# Patient Record
Sex: FEMALE | Race: OTHER | HISPANIC OR LATINO | Employment: PART TIME | URBAN - METROPOLITAN AREA
[De-identification: names, ages, dates, MRNs, and addresses within clinical notes are randomized per-mention and may not be internally consistent; named-entity substitution may affect disease eponyms.]

---

## 2020-07-10 ENCOUNTER — LAB VISIT (OUTPATIENT)
Dept: LAB | Facility: HOSPITAL | Age: 20
End: 2020-07-10
Attending: NURSE PRACTITIONER
Payer: COMMERCIAL

## 2020-07-10 ENCOUNTER — OFFICE VISIT (OUTPATIENT)
Dept: OBSTETRICS AND GYNECOLOGY | Facility: CLINIC | Age: 20
End: 2020-07-10
Payer: COMMERCIAL

## 2020-07-10 VITALS
DIASTOLIC BLOOD PRESSURE: 68 MMHG | WEIGHT: 154.56 LBS | HEIGHT: 61 IN | BODY MASS INDEX: 29.18 KG/M2 | SYSTOLIC BLOOD PRESSURE: 112 MMHG

## 2020-07-10 DIAGNOSIS — Z11.3 SCREEN FOR STD (SEXUALLY TRANSMITTED DISEASE): ICD-10-CM

## 2020-07-10 DIAGNOSIS — Z30.46 ENCOUNTER FOR SURVEILLANCE OF IMPLANTABLE SUBDERMAL CONTRACEPTIVE: ICD-10-CM

## 2020-07-10 DIAGNOSIS — N89.8 VAGINAL DISCHARGE: ICD-10-CM

## 2020-07-10 DIAGNOSIS — Z01.419 ROUTINE GYNECOLOGICAL EXAMINATION: Primary | ICD-10-CM

## 2020-07-10 PROCEDURE — 99999 PR PBB SHADOW E&M-NEW PATIENT-LVL II: ICD-10-PCS | Mod: PBBFAC,,, | Performed by: NURSE PRACTITIONER

## 2020-07-10 PROCEDURE — 86703 HIV-1/HIV-2 1 RESULT ANTBDY: CPT

## 2020-07-10 PROCEDURE — 3008F PR BODY MASS INDEX (BMI) DOCUMENTED: ICD-10-PCS | Mod: CPTII,S$GLB,, | Performed by: NURSE PRACTITIONER

## 2020-07-10 PROCEDURE — 99385 PREV VISIT NEW AGE 18-39: CPT | Mod: S$GLB,,, | Performed by: NURSE PRACTITIONER

## 2020-07-10 PROCEDURE — 99385 PR PREVENTIVE VISIT,NEW,18-39: ICD-10-PCS | Mod: S$GLB,,, | Performed by: NURSE PRACTITIONER

## 2020-07-10 PROCEDURE — 87491 CHLMYD TRACH DNA AMP PROBE: CPT

## 2020-07-10 PROCEDURE — 3008F BODY MASS INDEX DOCD: CPT | Mod: CPTII,S$GLB,, | Performed by: NURSE PRACTITIONER

## 2020-07-10 PROCEDURE — 36415 COLL VENOUS BLD VENIPUNCTURE: CPT

## 2020-07-10 PROCEDURE — 86592 SYPHILIS TEST NON-TREP QUAL: CPT

## 2020-07-10 PROCEDURE — 99999 PR PBB SHADOW E&M-NEW PATIENT-LVL II: CPT | Mod: PBBFAC,,, | Performed by: NURSE PRACTITIONER

## 2020-07-10 NOTE — PROGRESS NOTES
CC: Well woman exam    HPI  Janelle Clemente is a 19 y.o. female  presents for a well woman exam.  LMP: No LMP recorded (lmp unknown). Patient has had an implant..  No issues, problems, or complaints.    History reviewed. No pertinent past medical history.  History reviewed. No pertinent surgical history.  Social History     Socioeconomic History    Marital status: Single     Spouse name: Not on file    Number of children: Not on file    Years of education: Not on file    Highest education level: Not on file   Occupational History    Not on file   Social Needs    Financial resource strain: Not on file    Food insecurity     Worry: Not on file     Inability: Not on file    Transportation needs     Medical: Not on file     Non-medical: Not on file   Tobacco Use    Smoking status: Never Smoker    Smokeless tobacco: Never Used   Substance and Sexual Activity    Alcohol use: Never     Frequency: Never    Drug use: Never    Sexual activity: Yes     Partners: Male     Birth control/protection: Implant   Lifestyle    Physical activity     Days per week: Not on file     Minutes per session: Not on file    Stress: Not on file   Relationships    Social connections     Talks on phone: Not on file     Gets together: Not on file     Attends [employer] service: Not on file     Active member of club or organization: Not on file     Attends meetings of clubs or organizations: Not on file     Relationship status: Not on file   Other Topics Concern    Not on file   Social History Narrative    Not on file     Family History   Problem Relation Age of Onset    Breast cancer Neg Hx     Colon cancer Neg Hx     Ovarian cancer Neg Hx     Thrombosis Neg Hx      OB History        0    Para   0    Term   0       0    AB   0    Living   0       SAB   0    TAB   0    Ectopic   0    Multiple   0    Live Births   0               No current outpatient medications on file.    GYNECOLOGY HISTORY:  Happy  "with Nexplanon as method of birth control     DATA REVIEWED:  Last pap: n/a    /68   Ht 5' 1" (1.549 m)   Wt 70.1 kg (154 lb 8.7 oz)   LMP  (LMP Unknown)   BMI 29.20 kg/m²     Review of Systems   Constitutional: Negative.    HENT: Negative.    Eyes: Negative.    Respiratory: Negative.    Cardiovascular: Negative.    Gastrointestinal: Negative.    Endocrine: Negative.    Genitourinary: Negative.    Musculoskeletal: Negative.    Skin: Negative.    Allergic/Immunologic: Negative.    Neurological: Negative.    Hematological: Negative.    Psychiatric/Behavioral: Negative.        Physical Exam  Constitutional:       Appearance: Normal appearance.   HENT:      Head: Normocephalic.      Nose: Nose normal.      Mouth/Throat:      Mouth: Mucous membranes are moist.   Eyes:      Extraocular Movements: Extraocular movements intact.   Neck:      Musculoskeletal: Normal range of motion.   Pulmonary:      Effort: Pulmonary effort is normal.   Abdominal:      General: Abdomen is flat.      Palpations: Abdomen is soft.   Genitourinary:     General: Normal vulva.      Rectum: Normal.   Musculoskeletal: Normal range of motion.   Skin:     General: Skin is warm and dry.   Neurological:      Mental Status: She is alert and oriented to person, place, and time.   Psychiatric:         Mood and Affect: Mood normal.         Behavior: Behavior normal.         Thought Content: Thought content normal.         Judgment: Judgment normal.         Routine gynecological examination    Screen for STD (sexually transmitted disease)  -     C. trachomatis/N. gonorrhoeae by AMP DNA [facility]; Cervix  -     HIV 1/2 Ag/Ab (4th Gen); Future; Expected date: 07/10/2020  -     RPR; Future; Expected date: 07/10/2020    Vaginal discharge  -     POCT WET PREP    Encounter for surveillance of implantable subdermal contraceptive    Happy with Nexplanon as method of birth control   Left arm implant palpated and visualized     Patient was counseled today on " A.C.S. Pap guidelines (Q3) and recommendations for yearly pelvic exams, yearly mammograms starting age 40, and clinical breast exams; to see her PCP for other health maintenance.

## 2020-07-11 LAB — RPR SER QL: NORMAL

## 2020-07-13 LAB
C TRACH DNA SPEC QL NAA+PROBE: NOT DETECTED
HIV 1+2 AB+HIV1 P24 AG SERPL QL IA: NEGATIVE
N GONORRHOEA DNA SPEC QL NAA+PROBE: NOT DETECTED

## 2021-04-27 ENCOUNTER — OFFICE VISIT (OUTPATIENT)
Dept: PRIMARY CARE CLINIC | Facility: CLINIC | Age: 21
End: 2021-04-27
Payer: COMMERCIAL

## 2021-04-27 VITALS
HEART RATE: 75 BPM | BODY MASS INDEX: 29.53 KG/M2 | OXYGEN SATURATION: 99 % | DIASTOLIC BLOOD PRESSURE: 60 MMHG | SYSTOLIC BLOOD PRESSURE: 100 MMHG | TEMPERATURE: 98 F | WEIGHT: 156.31 LBS

## 2021-04-27 DIAGNOSIS — H92.02 LEFT EAR PAIN: ICD-10-CM

## 2021-04-27 DIAGNOSIS — R07.0 THROAT PAIN IN ADULT: ICD-10-CM

## 2021-04-27 DIAGNOSIS — F45.8 TEETH GRINDING: ICD-10-CM

## 2021-04-27 DIAGNOSIS — M54.30 SCIATIC LEG PAIN: Primary | ICD-10-CM

## 2021-04-27 LAB
CTP QC/QA: YES
S PYO RRNA THROAT QL PROBE: NEGATIVE

## 2021-04-27 PROCEDURE — 87880 STREP A ASSAY W/OPTIC: CPT | Mod: QW,S$GLB,, | Performed by: FAMILY MEDICINE

## 2021-04-27 PROCEDURE — 1125F PR PAIN SEVERITY QUANTIFIED, PAIN PRESENT: ICD-10-PCS | Mod: S$GLB,,, | Performed by: FAMILY MEDICINE

## 2021-04-27 PROCEDURE — 87880 POCT RAPID STREP A: ICD-10-PCS | Mod: QW,S$GLB,, | Performed by: FAMILY MEDICINE

## 2021-04-27 PROCEDURE — 3008F BODY MASS INDEX DOCD: CPT | Mod: CPTII,S$GLB,, | Performed by: FAMILY MEDICINE

## 2021-04-27 PROCEDURE — 3008F PR BODY MASS INDEX (BMI) DOCUMENTED: ICD-10-PCS | Mod: CPTII,S$GLB,, | Performed by: FAMILY MEDICINE

## 2021-04-27 PROCEDURE — 87081 CULTURE SCREEN ONLY: CPT | Performed by: FAMILY MEDICINE

## 2021-04-27 PROCEDURE — 99204 PR OFFICE/OUTPT VISIT, NEW, LEVL IV, 45-59 MIN: ICD-10-PCS | Mod: 25,S$GLB,, | Performed by: FAMILY MEDICINE

## 2021-04-27 PROCEDURE — 99204 OFFICE O/P NEW MOD 45 MIN: CPT | Mod: 25,S$GLB,, | Performed by: FAMILY MEDICINE

## 2021-04-27 PROCEDURE — 99999 PR PBB SHADOW E&M-EST. PATIENT-LVL III: CPT | Mod: PBBFAC,,, | Performed by: FAMILY MEDICINE

## 2021-04-27 PROCEDURE — 1125F AMNT PAIN NOTED PAIN PRSNT: CPT | Mod: S$GLB,,, | Performed by: FAMILY MEDICINE

## 2021-04-27 PROCEDURE — 99999 PR PBB SHADOW E&M-EST. PATIENT-LVL III: ICD-10-PCS | Mod: PBBFAC,,, | Performed by: FAMILY MEDICINE

## 2021-04-27 RX ORDER — CYCLOBENZAPRINE HCL 5 MG
2.5-5 TABLET ORAL NIGHTLY PRN
Qty: 30 TABLET | Refills: 0 | Status: SHIPPED | OUTPATIENT
Start: 2021-04-27 | End: 2021-09-07

## 2021-04-27 RX ORDER — LORATADINE 10 MG/1
10 TABLET ORAL DAILY PRN
Qty: 30 TABLET | Refills: 0 | Status: SHIPPED | OUTPATIENT
Start: 2021-04-27 | End: 2021-09-07

## 2021-04-27 RX ORDER — FLUTICASONE PROPIONATE 50 MCG
2 SPRAY, SUSPENSION (ML) NASAL DAILY
Qty: 16 G | Refills: 1 | Status: SHIPPED | OUTPATIENT
Start: 2021-04-27 | End: 2021-09-07

## 2021-05-01 LAB — BACTERIA THROAT CULT: NORMAL

## 2021-09-07 ENCOUNTER — OFFICE VISIT (OUTPATIENT)
Dept: OBSTETRICS AND GYNECOLOGY | Facility: CLINIC | Age: 21
End: 2021-09-07
Payer: COMMERCIAL

## 2021-09-07 ENCOUNTER — LAB VISIT (OUTPATIENT)
Dept: LAB | Facility: HOSPITAL | Age: 21
End: 2021-09-07
Attending: NURSE PRACTITIONER
Payer: COMMERCIAL

## 2021-09-07 VITALS
SYSTOLIC BLOOD PRESSURE: 100 MMHG | WEIGHT: 130.94 LBS | BODY MASS INDEX: 24.72 KG/M2 | DIASTOLIC BLOOD PRESSURE: 62 MMHG | HEIGHT: 61 IN

## 2021-09-07 DIAGNOSIS — Z11.3 SCREENING FOR STDS (SEXUALLY TRANSMITTED DISEASES): ICD-10-CM

## 2021-09-07 DIAGNOSIS — Z30.09 OTHER GENERAL COUNSELING AND ADVICE FOR CONTRACEPTIVE MANAGEMENT: Primary | ICD-10-CM

## 2021-09-07 PROCEDURE — 99999 PR PBB SHADOW E&M-EST. PATIENT-LVL II: CPT | Mod: PBBFAC,,, | Performed by: NURSE PRACTITIONER

## 2021-09-07 PROCEDURE — 3078F DIAST BP <80 MM HG: CPT | Mod: CPTII,S$GLB,, | Performed by: NURSE PRACTITIONER

## 2021-09-07 PROCEDURE — 87591 N.GONORRHOEAE DNA AMP PROB: CPT | Performed by: NURSE PRACTITIONER

## 2021-09-07 PROCEDURE — 3008F BODY MASS INDEX DOCD: CPT | Mod: CPTII,S$GLB,, | Performed by: NURSE PRACTITIONER

## 2021-09-07 PROCEDURE — 36415 COLL VENOUS BLD VENIPUNCTURE: CPT | Performed by: NURSE PRACTITIONER

## 2021-09-07 PROCEDURE — 99213 OFFICE O/P EST LOW 20 MIN: CPT | Mod: S$GLB,,, | Performed by: NURSE PRACTITIONER

## 2021-09-07 PROCEDURE — 1159F PR MEDICATION LIST DOCUMENTED IN MEDICAL RECORD: ICD-10-PCS | Mod: CPTII,S$GLB,, | Performed by: NURSE PRACTITIONER

## 2021-09-07 PROCEDURE — 87389 HIV-1 AG W/HIV-1&-2 AB AG IA: CPT | Performed by: NURSE PRACTITIONER

## 2021-09-07 PROCEDURE — 99999 PR PBB SHADOW E&M-EST. PATIENT-LVL II: ICD-10-PCS | Mod: PBBFAC,,, | Performed by: NURSE PRACTITIONER

## 2021-09-07 PROCEDURE — 86592 SYPHILIS TEST NON-TREP QUAL: CPT | Performed by: NURSE PRACTITIONER

## 2021-09-07 PROCEDURE — 3078F PR MOST RECENT DIASTOLIC BLOOD PRESSURE < 80 MM HG: ICD-10-PCS | Mod: CPTII,S$GLB,, | Performed by: NURSE PRACTITIONER

## 2021-09-07 PROCEDURE — 87491 CHLMYD TRACH DNA AMP PROBE: CPT | Performed by: NURSE PRACTITIONER

## 2021-09-07 PROCEDURE — 3074F PR MOST RECENT SYSTOLIC BLOOD PRESSURE < 130 MM HG: ICD-10-PCS | Mod: CPTII,S$GLB,, | Performed by: NURSE PRACTITIONER

## 2021-09-07 PROCEDURE — 3074F SYST BP LT 130 MM HG: CPT | Mod: CPTII,S$GLB,, | Performed by: NURSE PRACTITIONER

## 2021-09-07 PROCEDURE — 1159F MED LIST DOCD IN RCRD: CPT | Mod: CPTII,S$GLB,, | Performed by: NURSE PRACTITIONER

## 2021-09-07 PROCEDURE — 99213 PR OFFICE/OUTPT VISIT, EST, LEVL III, 20-29 MIN: ICD-10-PCS | Mod: S$GLB,,, | Performed by: NURSE PRACTITIONER

## 2021-09-07 PROCEDURE — 3008F PR BODY MASS INDEX (BMI) DOCUMENTED: ICD-10-PCS | Mod: CPTII,S$GLB,, | Performed by: NURSE PRACTITIONER

## 2021-09-08 LAB
C TRACH DNA SPEC QL NAA+PROBE: NOT DETECTED
HIV 1+2 AB+HIV1 P24 AG SERPL QL IA: NEGATIVE
N GONORRHOEA DNA SPEC QL NAA+PROBE: NOT DETECTED
RPR SER QL: NORMAL

## 2021-10-21 ENCOUNTER — OFFICE VISIT (OUTPATIENT)
Dept: OBSTETRICS AND GYNECOLOGY | Facility: CLINIC | Age: 21
End: 2021-10-21
Payer: COMMERCIAL

## 2021-10-21 VITALS
WEIGHT: 136.88 LBS | BODY MASS INDEX: 25.84 KG/M2 | DIASTOLIC BLOOD PRESSURE: 72 MMHG | HEIGHT: 61 IN | SYSTOLIC BLOOD PRESSURE: 102 MMHG

## 2021-10-21 DIAGNOSIS — N89.8 VAGINAL ITCHING: Primary | ICD-10-CM

## 2021-10-21 PROCEDURE — 99213 PR OFFICE/OUTPT VISIT, EST, LEVL III, 20-29 MIN: ICD-10-PCS | Mod: S$GLB,,, | Performed by: NURSE PRACTITIONER

## 2021-10-21 PROCEDURE — 87481 CANDIDA DNA AMP PROBE: CPT | Mod: 59 | Performed by: NURSE PRACTITIONER

## 2021-10-21 PROCEDURE — 1159F PR MEDICATION LIST DOCUMENTED IN MEDICAL RECORD: ICD-10-PCS | Mod: CPTII,S$GLB,, | Performed by: NURSE PRACTITIONER

## 2021-10-21 PROCEDURE — 3078F DIAST BP <80 MM HG: CPT | Mod: CPTII,S$GLB,, | Performed by: NURSE PRACTITIONER

## 2021-10-21 PROCEDURE — 99999 PR PBB SHADOW E&M-EST. PATIENT-LVL III: CPT | Mod: PBBFAC,,, | Performed by: NURSE PRACTITIONER

## 2021-10-21 PROCEDURE — 3074F SYST BP LT 130 MM HG: CPT | Mod: CPTII,S$GLB,, | Performed by: NURSE PRACTITIONER

## 2021-10-21 PROCEDURE — 3008F BODY MASS INDEX DOCD: CPT | Mod: CPTII,S$GLB,, | Performed by: NURSE PRACTITIONER

## 2021-10-21 PROCEDURE — 3008F PR BODY MASS INDEX (BMI) DOCUMENTED: ICD-10-PCS | Mod: CPTII,S$GLB,, | Performed by: NURSE PRACTITIONER

## 2021-10-21 PROCEDURE — 1160F PR REVIEW ALL MEDS BY PRESCRIBER/CLIN PHARMACIST DOCUMENTED: ICD-10-PCS | Mod: CPTII,S$GLB,, | Performed by: NURSE PRACTITIONER

## 2021-10-21 PROCEDURE — 1160F RVW MEDS BY RX/DR IN RCRD: CPT | Mod: CPTII,S$GLB,, | Performed by: NURSE PRACTITIONER

## 2021-10-21 PROCEDURE — 99213 OFFICE O/P EST LOW 20 MIN: CPT | Mod: S$GLB,,, | Performed by: NURSE PRACTITIONER

## 2021-10-21 PROCEDURE — 1159F MED LIST DOCD IN RCRD: CPT | Mod: CPTII,S$GLB,, | Performed by: NURSE PRACTITIONER

## 2021-10-21 PROCEDURE — 3078F PR MOST RECENT DIASTOLIC BLOOD PRESSURE < 80 MM HG: ICD-10-PCS | Mod: CPTII,S$GLB,, | Performed by: NURSE PRACTITIONER

## 2021-10-21 PROCEDURE — 3074F PR MOST RECENT SYSTOLIC BLOOD PRESSURE < 130 MM HG: ICD-10-PCS | Mod: CPTII,S$GLB,, | Performed by: NURSE PRACTITIONER

## 2021-10-21 PROCEDURE — 99999 PR PBB SHADOW E&M-EST. PATIENT-LVL III: ICD-10-PCS | Mod: PBBFAC,,, | Performed by: NURSE PRACTITIONER

## 2021-10-21 RX ORDER — FLUCONAZOLE 150 MG/1
TABLET ORAL
Qty: 3 TABLET | Refills: 0 | Status: SHIPPED | OUTPATIENT
Start: 2021-10-21 | End: 2021-11-26

## 2021-10-25 LAB
BACTERIAL VAGINOSIS DNA: NEGATIVE
CANDIDA GLABRATA DNA: NEGATIVE
CANDIDA KRUSEI DNA: NEGATIVE
CANDIDA RRNA VAG QL PROBE: POSITIVE
T VAGINALIS RRNA GENITAL QL PROBE: NEGATIVE

## 2021-11-16 ENCOUNTER — OFFICE VISIT (OUTPATIENT)
Dept: OBSTETRICS AND GYNECOLOGY | Facility: CLINIC | Age: 21
End: 2021-11-16
Payer: COMMERCIAL

## 2021-11-16 VITALS
HEIGHT: 61 IN | DIASTOLIC BLOOD PRESSURE: 82 MMHG | SYSTOLIC BLOOD PRESSURE: 110 MMHG | BODY MASS INDEX: 25.64 KG/M2 | WEIGHT: 135.81 LBS

## 2021-11-16 DIAGNOSIS — N64.9 ABNORMAL NIPPLE: ICD-10-CM

## 2021-11-16 DIAGNOSIS — Z97.5 NEXPLANON IN PLACE: ICD-10-CM

## 2021-11-16 DIAGNOSIS — Z12.4 PAPANICOLAOU SMEAR FOR CERVICAL CANCER SCREENING: ICD-10-CM

## 2021-11-16 DIAGNOSIS — Z01.419 ROUTINE GYNECOLOGICAL EXAMINATION: Primary | ICD-10-CM

## 2021-11-16 PROCEDURE — 3008F BODY MASS INDEX DOCD: CPT | Mod: CPTII,S$GLB,, | Performed by: NURSE PRACTITIONER

## 2021-11-16 PROCEDURE — 3008F PR BODY MASS INDEX (BMI) DOCUMENTED: ICD-10-PCS | Mod: CPTII,S$GLB,, | Performed by: NURSE PRACTITIONER

## 2021-11-16 PROCEDURE — 1160F PR REVIEW ALL MEDS BY PRESCRIBER/CLIN PHARMACIST DOCUMENTED: ICD-10-PCS | Mod: CPTII,S$GLB,, | Performed by: NURSE PRACTITIONER

## 2021-11-16 PROCEDURE — 99999 PR PBB SHADOW E&M-EST. PATIENT-LVL III: ICD-10-PCS | Mod: PBBFAC,,, | Performed by: NURSE PRACTITIONER

## 2021-11-16 PROCEDURE — 1159F MED LIST DOCD IN RCRD: CPT | Mod: CPTII,S$GLB,, | Performed by: NURSE PRACTITIONER

## 2021-11-16 PROCEDURE — 3079F DIAST BP 80-89 MM HG: CPT | Mod: CPTII,S$GLB,, | Performed by: NURSE PRACTITIONER

## 2021-11-16 PROCEDURE — 88175 CYTOPATH C/V AUTO FLUID REDO: CPT | Performed by: NURSE PRACTITIONER

## 2021-11-16 PROCEDURE — 99999 PR PBB SHADOW E&M-EST. PATIENT-LVL III: CPT | Mod: PBBFAC,,, | Performed by: NURSE PRACTITIONER

## 2021-11-16 PROCEDURE — 3074F PR MOST RECENT SYSTOLIC BLOOD PRESSURE < 130 MM HG: ICD-10-PCS | Mod: CPTII,S$GLB,, | Performed by: NURSE PRACTITIONER

## 2021-11-16 PROCEDURE — 3079F PR MOST RECENT DIASTOLIC BLOOD PRESSURE 80-89 MM HG: ICD-10-PCS | Mod: CPTII,S$GLB,, | Performed by: NURSE PRACTITIONER

## 2021-11-16 PROCEDURE — 1160F RVW MEDS BY RX/DR IN RCRD: CPT | Mod: CPTII,S$GLB,, | Performed by: NURSE PRACTITIONER

## 2021-11-16 PROCEDURE — 1159F PR MEDICATION LIST DOCUMENTED IN MEDICAL RECORD: ICD-10-PCS | Mod: CPTII,S$GLB,, | Performed by: NURSE PRACTITIONER

## 2021-11-16 PROCEDURE — 87624 HPV HI-RISK TYP POOLED RSLT: CPT | Performed by: NURSE PRACTITIONER

## 2021-11-16 PROCEDURE — 99395 PR PREVENTIVE VISIT,EST,18-39: ICD-10-PCS | Mod: S$GLB,,, | Performed by: NURSE PRACTITIONER

## 2021-11-16 PROCEDURE — 99395 PREV VISIT EST AGE 18-39: CPT | Mod: S$GLB,,, | Performed by: NURSE PRACTITIONER

## 2021-11-16 PROCEDURE — 3074F SYST BP LT 130 MM HG: CPT | Mod: CPTII,S$GLB,, | Performed by: NURSE PRACTITIONER

## 2021-11-16 PROCEDURE — 87625 HPV TYPES 16 & 18 ONLY: CPT | Performed by: NURSE PRACTITIONER

## 2021-11-19 ENCOUNTER — PATIENT MESSAGE (OUTPATIENT)
Dept: OBSTETRICS AND GYNECOLOGY | Facility: CLINIC | Age: 21
End: 2021-11-19
Payer: COMMERCIAL

## 2021-11-26 ENCOUNTER — TELEPHONE (OUTPATIENT)
Dept: OBSTETRICS AND GYNECOLOGY | Facility: CLINIC | Age: 21
End: 2021-11-26
Payer: COMMERCIAL

## 2021-11-26 DIAGNOSIS — N64.9 ABNORMAL NIPPLE: Primary | ICD-10-CM

## 2021-11-26 LAB
CLINICAL INFO: ABNORMAL
CYTO CVX: ABNORMAL
CYTOLOGIST CVX/VAG CYTO: ABNORMAL
CYTOLOGIST CVX/VAG CYTO: ABNORMAL
CYTOLOGY CMNT CVX/VAG CYTO-IMP: ABNORMAL
CYTOLOGY PAP THIN PREP EXPLANATION: ABNORMAL
DATE OF PREVIOUS PAP: ABNORMAL
DATE PREVIOUS BX: NO
GEN CATEG CVX/VAG CYTO-IMP: ABNORMAL
HPV I/H RISK 4 DNA CVX QL NAA+PROBE: DETECTED
HPV16 DNA CVX QL PROBE+SIG AMP: NOT DETECTED
HPV18 DNA CVX QL PROBE+SIG AMP: NOT DETECTED
LMP START DATE: ABNORMAL
MICROORGANISM CVX/VAG CYTO: ABNORMAL
PATHOLOGIST CVX/VAG CYTO: ABNORMAL
SERVICE CMNT-IMP: ABNORMAL
SPECIMEN SOURCE CVX/VAG CYTO: ABNORMAL
STAT OF ADQ CVX/VAG CYTO-IMP: ABNORMAL

## 2021-11-28 ENCOUNTER — PATIENT MESSAGE (OUTPATIENT)
Dept: OBSTETRICS AND GYNECOLOGY | Facility: CLINIC | Age: 21
End: 2021-11-28
Payer: COMMERCIAL

## 2021-12-02 ENCOUNTER — HOSPITAL ENCOUNTER (OUTPATIENT)
Dept: RADIOLOGY | Facility: HOSPITAL | Age: 21
Discharge: HOME | End: 2021-12-02
Attending: NURSE PRACTITIONER
Payer: COMMERCIAL

## 2021-12-02 ENCOUNTER — OFFICE VISIT (OUTPATIENT)
Dept: OBSTETRICS AND GYNECOLOGY | Facility: CLINIC | Age: 21
End: 2021-12-02
Payer: COMMERCIAL

## 2021-12-02 VITALS
BODY MASS INDEX: 25.27 KG/M2 | SYSTOLIC BLOOD PRESSURE: 100 MMHG | DIASTOLIC BLOOD PRESSURE: 62 MMHG | HEIGHT: 61 IN | WEIGHT: 133.81 LBS

## 2021-12-02 DIAGNOSIS — N92.1 BREAKTHROUGH BLEEDING ON NEXPLANON: Primary | ICD-10-CM

## 2021-12-02 DIAGNOSIS — N64.9 ABNORMAL NIPPLE: ICD-10-CM

## 2021-12-02 DIAGNOSIS — Z97.5 BREAKTHROUGH BLEEDING ON NEXPLANON: Primary | ICD-10-CM

## 2021-12-02 PROCEDURE — 99213 PR OFFICE/OUTPT VISIT, EST, LEVL III, 20-29 MIN: ICD-10-PCS | Mod: S$GLB,,, | Performed by: NURSE PRACTITIONER

## 2021-12-02 PROCEDURE — 76642 ULTRASOUND BREAST LIMITED: CPT | Mod: TC,LT

## 2021-12-02 PROCEDURE — 76642 ULTRASOUND BREAST LIMITED: CPT | Mod: 26,LT,, | Performed by: RADIOLOGY

## 2021-12-02 PROCEDURE — 99999 PR PBB SHADOW E&M-EST. PATIENT-LVL III: ICD-10-PCS | Mod: PBBFAC,,, | Performed by: NURSE PRACTITIONER

## 2021-12-02 PROCEDURE — 99213 OFFICE O/P EST LOW 20 MIN: CPT | Mod: S$GLB,,, | Performed by: NURSE PRACTITIONER

## 2021-12-02 PROCEDURE — 76642 US BREAST LEFT LIMITED: ICD-10-PCS | Mod: 26,LT,, | Performed by: RADIOLOGY

## 2021-12-02 PROCEDURE — 99999 PR PBB SHADOW E&M-EST. PATIENT-LVL III: CPT | Mod: PBBFAC,,, | Performed by: NURSE PRACTITIONER

## 2021-12-03 ENCOUNTER — TELEPHONE (OUTPATIENT)
Dept: OBSTETRICS AND GYNECOLOGY | Facility: CLINIC | Age: 21
End: 2021-12-03
Payer: COMMERCIAL

## 2021-12-03 DIAGNOSIS — Z11.3 SCREEN FOR STD (SEXUALLY TRANSMITTED DISEASE): Primary | ICD-10-CM

## 2021-12-03 DIAGNOSIS — Z11.3 SCREENING EXAMINATION FOR STD (SEXUALLY TRANSMITTED DISEASE): Primary | ICD-10-CM

## 2021-12-11 ENCOUNTER — PATIENT MESSAGE (OUTPATIENT)
Dept: OBSTETRICS AND GYNECOLOGY | Facility: CLINIC | Age: 21
End: 2021-12-11
Payer: COMMERCIAL

## 2022-03-02 ENCOUNTER — TELEPHONE (OUTPATIENT)
Dept: OBSTETRICS AND GYNECOLOGY | Facility: CLINIC | Age: 22
End: 2022-03-02
Payer: MEDICAID

## 2022-03-02 DIAGNOSIS — Z30.017 ENCOUNTER FOR INITIAL PRESCRIPTION OF NEXPLANON: Primary | ICD-10-CM

## 2022-03-02 NOTE — TELEPHONE ENCOUNTER
Pt states her nexplanon is due to be removed, she is wanting her nexplanon replaced with a new one. Order pending, please sign.

## 2022-03-17 ENCOUNTER — PROCEDURE VISIT (OUTPATIENT)
Dept: OBSTETRICS AND GYNECOLOGY | Facility: CLINIC | Age: 22
End: 2022-03-17
Payer: MEDICAID

## 2022-03-17 VITALS
WEIGHT: 138.88 LBS | BODY MASS INDEX: 26.24 KG/M2 | DIASTOLIC BLOOD PRESSURE: 64 MMHG | SYSTOLIC BLOOD PRESSURE: 110 MMHG

## 2022-03-17 DIAGNOSIS — Z30.46 ENCOUNTER FOR REMOVAL AND REINSERTION OF NEXPLANON: Primary | ICD-10-CM

## 2022-03-17 PROCEDURE — 11983 INSERTION OF NEXPLANON: ICD-10-PCS | Mod: S$PBB,,, | Performed by: NURSE PRACTITIONER

## 2022-03-17 PROCEDURE — 11981 INSERTION DRUG DLVR IMPLANT: CPT | Mod: PBBFAC | Performed by: NURSE PRACTITIONER

## 2022-03-17 PROCEDURE — 11983 REMOVE/INSERT DRUG IMPLANT: CPT | Mod: PBBFAC | Performed by: NURSE PRACTITIONER

## 2022-03-17 PROCEDURE — 11982 REMOVE DRUG IMPLANT DEVICE: CPT | Mod: PBBFAC | Performed by: NURSE PRACTITIONER

## 2022-03-17 PROCEDURE — 11983 REMOVE/INSERT DRUG IMPLANT: CPT | Mod: S$PBB,,, | Performed by: NURSE PRACTITIONER

## 2022-03-17 RX ADMIN — ETONOGESTREL 68 MG: 68 IMPLANT SUBCUTANEOUS at 02:03

## 2022-03-17 NOTE — PROCEDURES
Insertion of Nexplanon    Date/Time: 3/17/2022 2:00 PM  Performed by: Jackie Rodriguez NP  Authorized by: Jackie Rodriguez NP     Consent obtained:  Verbal and written  Consent given by:  Patient  Patient questions answered: yes    Patient agrees, verbalizes understanding, and wants to proceed: yes    Educational handouts given: yes    Instructions and paperwork completed: yes    Pre-procedure timeout performed: yes    Prepped with: alcohol 70%    Local anesthetic:  Lidocaine with epinephrine  The site was cleaned and prepped in a sterile fashion: yes    Small stab incision was made in arm: yes    Left/right:  Left   68 mg etonogestreL 68 mg  Preloaded Implanon trocar was placed subdermally: yes    Visualization of implant was obtained: yes    Nexplanon was inserted and trocar removed: yes    Visualization of notch in stilette and palpitation of device: yes    Palpitation confirms placement by provider and patient: yes    Site was closed with steri-strips and pressure bandage applied: yes

## 2022-03-17 NOTE — PROCEDURES
NEXPLANON REMOVAL        Exam: Nexplanon easily palpated in left upper extremity    Procedure: Skin overlying the device was prepped with alcohol.  2 cc 1% lidocaine without epinephrine was injected subcutaneously.  The skin was then prepped with betadine.  A 3mm incision was made in the left arm  at the tip of the implant using a #11 scalpel.  The device was pushed toward the incision, grasped with hemostats, and was removed intact.  Hemostasis was achieved with gentle pressure.  The wound was dressed with a band-aid.  The patient tolerated the procedure well.    Post-procedure counseling: The patient was given pain, fever, and bleeding precautions.  Advised to use tylenol and ibuprofen prn pain.    RTC: prn

## 2022-04-27 ENCOUNTER — PATIENT MESSAGE (OUTPATIENT)
Dept: ADMINISTRATIVE | Facility: HOSPITAL | Age: 22
End: 2022-04-27
Payer: MEDICAID

## 2022-08-04 ENCOUNTER — TELEPHONE (OUTPATIENT)
Dept: OBSTETRICS AND GYNECOLOGY | Facility: CLINIC | Age: 22
End: 2022-08-04
Payer: MEDICAID

## 2022-09-14 ENCOUNTER — OFFICE VISIT (OUTPATIENT)
Dept: OBSTETRICS AND GYNECOLOGY | Facility: CLINIC | Age: 22
End: 2022-09-14
Payer: MEDICAID

## 2022-09-14 VITALS
BODY MASS INDEX: 26.89 KG/M2 | SYSTOLIC BLOOD PRESSURE: 120 MMHG | DIASTOLIC BLOOD PRESSURE: 74 MMHG | HEIGHT: 61 IN | WEIGHT: 142.44 LBS

## 2022-09-14 DIAGNOSIS — Z11.3 SCREENING EXAMINATION FOR STD (SEXUALLY TRANSMITTED DISEASE): ICD-10-CM

## 2022-09-14 DIAGNOSIS — Z30.09 CONTRACEPTIVE EDUCATION: Primary | ICD-10-CM

## 2022-09-14 PROCEDURE — 3074F PR MOST RECENT SYSTOLIC BLOOD PRESSURE < 130 MM HG: ICD-10-PCS | Mod: CPTII,,, | Performed by: NURSE PRACTITIONER

## 2022-09-14 PROCEDURE — 99212 PR OFFICE/OUTPT VISIT, EST, LEVL II, 10-19 MIN: ICD-10-PCS | Mod: S$PBB,,, | Performed by: NURSE PRACTITIONER

## 2022-09-14 PROCEDURE — 99212 OFFICE O/P EST SF 10 MIN: CPT | Mod: S$PBB,,, | Performed by: NURSE PRACTITIONER

## 2022-09-14 PROCEDURE — 1160F PR REVIEW ALL MEDS BY PRESCRIBER/CLIN PHARMACIST DOCUMENTED: ICD-10-PCS | Mod: CPTII,,, | Performed by: NURSE PRACTITIONER

## 2022-09-14 PROCEDURE — 3008F PR BODY MASS INDEX (BMI) DOCUMENTED: ICD-10-PCS | Mod: CPTII,,, | Performed by: NURSE PRACTITIONER

## 2022-09-14 PROCEDURE — 1159F MED LIST DOCD IN RCRD: CPT | Mod: CPTII,,, | Performed by: NURSE PRACTITIONER

## 2022-09-14 PROCEDURE — 3008F BODY MASS INDEX DOCD: CPT | Mod: CPTII,,, | Performed by: NURSE PRACTITIONER

## 2022-09-14 PROCEDURE — 87591 N.GONORRHOEAE DNA AMP PROB: CPT | Performed by: NURSE PRACTITIONER

## 2022-09-14 PROCEDURE — 87491 CHLMYD TRACH DNA AMP PROBE: CPT | Performed by: NURSE PRACTITIONER

## 2022-09-14 PROCEDURE — 3078F PR MOST RECENT DIASTOLIC BLOOD PRESSURE < 80 MM HG: ICD-10-PCS | Mod: CPTII,,, | Performed by: NURSE PRACTITIONER

## 2022-09-14 PROCEDURE — 99213 OFFICE O/P EST LOW 20 MIN: CPT | Mod: PBBFAC,PO | Performed by: NURSE PRACTITIONER

## 2022-09-14 PROCEDURE — 99999 PR PBB SHADOW E&M-EST. PATIENT-LVL III: ICD-10-PCS | Mod: PBBFAC,,, | Performed by: NURSE PRACTITIONER

## 2022-09-14 PROCEDURE — 3074F SYST BP LT 130 MM HG: CPT | Mod: CPTII,,, | Performed by: NURSE PRACTITIONER

## 2022-09-14 PROCEDURE — 1159F PR MEDICATION LIST DOCUMENTED IN MEDICAL RECORD: ICD-10-PCS | Mod: CPTII,,, | Performed by: NURSE PRACTITIONER

## 2022-09-14 PROCEDURE — 99999 PR PBB SHADOW E&M-EST. PATIENT-LVL III: CPT | Mod: PBBFAC,,, | Performed by: NURSE PRACTITIONER

## 2022-09-14 PROCEDURE — 3078F DIAST BP <80 MM HG: CPT | Mod: CPTII,,, | Performed by: NURSE PRACTITIONER

## 2022-09-14 PROCEDURE — 1160F RVW MEDS BY RX/DR IN RCRD: CPT | Mod: CPTII,,, | Performed by: NURSE PRACTITIONER

## 2022-09-14 NOTE — PROGRESS NOTES
Subjective:       Patient ID: Janelle Clemente is a 21 y.o. female.    Chief Complaint:  Contraception (Would like to discuss non hormonal options  )    No LMP recorded. Patient has had an implant.  History of Present Illness  Presents today for std screening and ParaGard counseling   OB History    Para Term  AB Living   0 0 0 0 0 0   SAB IAB Ectopic Multiple Live Births   0 0 0 0 0       Review of Systems  Review of Systems        Objective:    Physical Exam      Assessment:     1. Contraceptive education    2. Screening examination for STD (sexually transmitted disease)              Plan:   Janelle was seen today for contraception.    Diagnoses and all orders for this visit:    Contraceptive education    Screening examination for STD (sexually transmitted disease)  -     C. trachomatis/N. gonorrhoeae by AMP DNA [facility]; Urine      Extensively counseled client on Paragard IUD.  Client currently has Nexplanon  After discussing the difference between Nexplanon and ParaGard. Client chooses to remain on Nexplanon   Verbalizes understanding that she is to return to clinic in November for WWE and repeat pap

## 2022-09-16 LAB
C TRACH DNA SPEC QL NAA+PROBE: NOT DETECTED
N GONORRHOEA DNA SPEC QL NAA+PROBE: NOT DETECTED

## 2022-11-01 ENCOUNTER — TELEPHONE (OUTPATIENT)
Dept: PRIMARY CARE CLINIC | Facility: CLINIC | Age: 22
End: 2022-11-01
Payer: MEDICAID

## 2022-11-01 NOTE — TELEPHONE ENCOUNTER
----- Message from Bernie Santoyo sent at 11/1/2022 10:12 AM CDT -----  Contact: Self/[phone]  Pt is calling in regards to scheduling an annual appointment. Please give her a call back at [phone]. Thank you s/g

## 2022-11-16 ENCOUNTER — TELEPHONE (OUTPATIENT)
Dept: OBSTETRICS AND GYNECOLOGY | Facility: CLINIC | Age: 22
End: 2022-11-16
Payer: MEDICAID

## 2022-11-16 NOTE — TELEPHONE ENCOUNTER
----- Message from Brina Esposito sent at 11/16/2022  7:28 AM CST -----  Regarding: appt access  Contact: pt  Pt is calling to reschedule her appt for tomorrow to Friday 01/18/22. Please call back at [phone]//thank you acc

## 2022-11-18 ENCOUNTER — OFFICE VISIT (OUTPATIENT)
Dept: OBSTETRICS AND GYNECOLOGY | Facility: CLINIC | Age: 22
End: 2022-11-18
Payer: MEDICAID

## 2022-11-18 ENCOUNTER — LAB VISIT (OUTPATIENT)
Dept: LAB | Facility: HOSPITAL | Age: 22
End: 2022-11-18
Attending: NURSE PRACTITIONER
Payer: MEDICAID

## 2022-11-18 VITALS
WEIGHT: 141.75 LBS | BODY MASS INDEX: 26.76 KG/M2 | SYSTOLIC BLOOD PRESSURE: 108 MMHG | DIASTOLIC BLOOD PRESSURE: 80 MMHG | HEIGHT: 61 IN

## 2022-11-18 DIAGNOSIS — Z11.3 SCREEN FOR STD (SEXUALLY TRANSMITTED DISEASE): ICD-10-CM

## 2022-11-18 DIAGNOSIS — Z97.5 NEXPLANON IN PLACE: ICD-10-CM

## 2022-11-18 DIAGNOSIS — R87.612 LGSIL ON PAP SMEAR OF CERVIX: ICD-10-CM

## 2022-11-18 DIAGNOSIS — Z01.419 ROUTINE GYNECOLOGICAL EXAMINATION: Primary | ICD-10-CM

## 2022-11-18 LAB
HAV IGM SERPL QL IA: NORMAL
HBV CORE IGM SERPL QL IA: NORMAL
HBV SURFACE AG SERPL QL IA: NORMAL
HCV AB SERPL QL IA: NORMAL
HIV 1+2 AB+HIV1 P24 AG SERPL QL IA: NORMAL

## 2022-11-18 PROCEDURE — 86592 SYPHILIS TEST NON-TREP QUAL: CPT | Performed by: NURSE PRACTITIONER

## 2022-11-18 PROCEDURE — 1160F PR REVIEW ALL MEDS BY PRESCRIBER/CLIN PHARMACIST DOCUMENTED: ICD-10-PCS | Mod: CPTII,,, | Performed by: NURSE PRACTITIONER

## 2022-11-18 PROCEDURE — 99999 PR PBB SHADOW E&M-EST. PATIENT-LVL III: CPT | Mod: PBBFAC,,, | Performed by: NURSE PRACTITIONER

## 2022-11-18 PROCEDURE — 99213 OFFICE O/P EST LOW 20 MIN: CPT | Mod: PBBFAC | Performed by: NURSE PRACTITIONER

## 2022-11-18 PROCEDURE — 36415 COLL VENOUS BLD VENIPUNCTURE: CPT | Performed by: NURSE PRACTITIONER

## 2022-11-18 PROCEDURE — 3008F BODY MASS INDEX DOCD: CPT | Mod: CPTII,,, | Performed by: NURSE PRACTITIONER

## 2022-11-18 PROCEDURE — 3008F PR BODY MASS INDEX (BMI) DOCUMENTED: ICD-10-PCS | Mod: CPTII,,, | Performed by: NURSE PRACTITIONER

## 2022-11-18 PROCEDURE — 99395 PR PREVENTIVE VISIT,EST,18-39: ICD-10-PCS | Mod: S$PBB,,, | Performed by: NURSE PRACTITIONER

## 2022-11-18 PROCEDURE — 3074F PR MOST RECENT SYSTOLIC BLOOD PRESSURE < 130 MM HG: ICD-10-PCS | Mod: CPTII,,, | Performed by: NURSE PRACTITIONER

## 2022-11-18 PROCEDURE — 3079F DIAST BP 80-89 MM HG: CPT | Mod: CPTII,,, | Performed by: NURSE PRACTITIONER

## 2022-11-18 PROCEDURE — 1160F RVW MEDS BY RX/DR IN RCRD: CPT | Mod: CPTII,,, | Performed by: NURSE PRACTITIONER

## 2022-11-18 PROCEDURE — 88175 CYTOPATH C/V AUTO FLUID REDO: CPT | Performed by: NURSE PRACTITIONER

## 2022-11-18 PROCEDURE — 99999 PR PBB SHADOW E&M-EST. PATIENT-LVL III: ICD-10-PCS | Mod: PBBFAC,,, | Performed by: NURSE PRACTITIONER

## 2022-11-18 PROCEDURE — 80074 ACUTE HEPATITIS PANEL: CPT | Performed by: NURSE PRACTITIONER

## 2022-11-18 PROCEDURE — 87591 N.GONORRHOEAE DNA AMP PROB: CPT | Performed by: NURSE PRACTITIONER

## 2022-11-18 PROCEDURE — 87389 HIV-1 AG W/HIV-1&-2 AB AG IA: CPT | Performed by: NURSE PRACTITIONER

## 2022-11-18 PROCEDURE — 1159F MED LIST DOCD IN RCRD: CPT | Mod: CPTII,,, | Performed by: NURSE PRACTITIONER

## 2022-11-18 PROCEDURE — 1159F PR MEDICATION LIST DOCUMENTED IN MEDICAL RECORD: ICD-10-PCS | Mod: CPTII,,, | Performed by: NURSE PRACTITIONER

## 2022-11-18 PROCEDURE — 3079F PR MOST RECENT DIASTOLIC BLOOD PRESSURE 80-89 MM HG: ICD-10-PCS | Mod: CPTII,,, | Performed by: NURSE PRACTITIONER

## 2022-11-18 PROCEDURE — 99395 PREV VISIT EST AGE 18-39: CPT | Mod: S$PBB,,, | Performed by: NURSE PRACTITIONER

## 2022-11-18 PROCEDURE — 87491 CHLMYD TRACH DNA AMP PROBE: CPT | Performed by: NURSE PRACTITIONER

## 2022-11-18 PROCEDURE — 3074F SYST BP LT 130 MM HG: CPT | Mod: CPTII,,, | Performed by: NURSE PRACTITIONER

## 2022-11-18 RX ORDER — PREDNISONE 10 MG/1
10 TABLET ORAL 2 TIMES DAILY PRN
COMMUNITY
Start: 2022-11-02

## 2022-11-18 RX ORDER — AMOXICILLIN 875 MG/1
TABLET, FILM COATED ORAL
COMMUNITY
Start: 2022-11-02

## 2022-11-18 RX ORDER — DEXBROMPHENIRAMINE MALEATE, PHENYLEPHRINE HYDROCHLORIDE 2; 7.5 MG/1; MG/1
1 TABLET ORAL EVERY 6 HOURS PRN
COMMUNITY
Start: 2022-11-02

## 2022-11-18 NOTE — PROGRESS NOTES
"  Subjective:       Patient ID: Janelle Clemente is a 22 y.o. female.    Chief Complaint:  Well Woman and STD CHECK      History of Present Illness  HPI  Presents today for WWE and std screening   Has Nexplanon in left arm   No complaints   Health Maintenance   Topic Date Due    Hepatitis C Screening  Never done    Lipid Panel  Never done    Chlamydia Screening  2023    Pap Smear  2024    TETANUS VACCINE  10/05/2029    HPV Vaccines  Completed     GYN & OB History  No LMP recorded. Patient has had an implant.   Date of Last Pap: No result found    OB History    Para Term  AB Living   0 0 0 0 0 0   SAB IAB Ectopic Multiple Live Births   0 0 0 0 0       Review of Systems  Review of Systems        Objective:   /80   Ht 5' 1" (1.549 m)   Wt 64.3 kg (141 lb 12.1 oz)   BMI 26.78 kg/m²    Physical Exam:   Constitutional: She is oriented to person, place, and time. She appears well-developed and well-nourished.        Pulmonary/Chest: Right breast exhibits no inverted nipple, no mass, no nipple discharge, no skin change, no tenderness and no swelling. Left breast exhibits no inverted nipple, no mass, no nipple discharge, no skin change, no tenderness and no swelling.        Abdominal: Soft.     Genitourinary:    Inguinal canal, vagina, uterus, right adnexa and left adnexa normal.      Pelvic exam was performed with patient supine.   The external female genitalia was normal.   Genitalia hair distrobution normal .   Labial bartholins normal.Cervix is normal. No no adexnal prolapse. Right adnexum displays no mass, no tenderness and no fullness. Left adnexum displays no mass, no tenderness and no fullness. No erythema,  no vaginal discharge, bleeding, rectocele, cystocele or unspecified prolapse of vaginal walls in the vagina.    No foreign body in the vagina.      No signs of injury in the vagina.      pap smear completed              Neurological: She is alert and oriented to person, " place, and time.    Skin: Skin is warm and dry.    Psychiatric: She has a normal mood and affect. Her behavior is normal. Judgment and thought content normal.      Assessment:        1. Routine gynecological examination    2. LGSIL on Pap smear of cervix    3. Screen for STD (sexually transmitted disease)    4. Nexplanon in place               Plan:      Return to clinic in one year for WWE      Janelle was seen today for well woman and std check.    Diagnoses and all orders for this visit:    Routine gynecological examination    LGSIL on Pap smear of cervix  -     Liquid-Based Pap Smear, Screening    Screen for STD (sexually transmitted disease)  -     C. trachomatis/N. gonorrhoeae by AMP DNA [facility]; Vagina  -     HIV 1/2 Ag/Ab (4th Gen); Future  -     RPR; Future  -     Hepatitis Panel, Acute; Future    Nexplanon in place  Left arm Nexplanon in place

## 2022-11-19 LAB
C TRACH DNA SPEC QL NAA+PROBE: NOT DETECTED
N GONORRHOEA DNA SPEC QL NAA+PROBE: NOT DETECTED
RPR SER QL: NORMAL

## 2022-11-23 LAB
FINAL PATHOLOGIC DIAGNOSIS: NORMAL
Lab: NORMAL

## 2023-01-13 ENCOUNTER — TELEPHONE (OUTPATIENT)
Dept: PRIMARY CARE CLINIC | Facility: CLINIC | Age: 23
End: 2023-01-13
Payer: MEDICAID

## 2023-01-13 NOTE — TELEPHONE ENCOUNTER
----- Message from Santo Sorensen sent at 1/13/2023 11:30 AM CST -----  Contact: Janelle  Patient is calling to speak with the nurse in regards to appt. Reports having sore throat, congestion, and ear ache. Please give patient a callback at .[phone]

## 2023-05-23 ENCOUNTER — TELEPHONE (OUTPATIENT)
Dept: OBSTETRICS AND GYNECOLOGY | Facility: CLINIC | Age: 23
End: 2023-05-23
Payer: MEDICAID

## 2023-05-23 NOTE — TELEPHONE ENCOUNTER
----- Message from Nisreen Calderon sent at 5/23/2023 11:36 AM CDT -----  Contact: Janelle  Type:  Sooner Apoointment Request    Caller is requesting a sooner appointment.  Caller declined first available appointment listed below.  Caller will not accept being placed on the waitlist and is requesting a message be sent to doctor.  Name of Caller:Janelle  When is the first available appointment?none  Symptoms:itching and burning  Would the patient rather a call back or a response via [facility]? Call back  Best Call Back Number:[phone]  Additional Information: Janelle would like to schedule a appointment  asap for burning and itching. Please give her a call back at [phone]    Thanks  KARIME

## 2023-05-23 NOTE — TELEPHONE ENCOUNTER
Called patient and she has appointment 6/7.  Checked all locations and with her insurance that is the soonest she can be scheduled.  Patient verbalized understanding.

## 2023-05-30 ENCOUNTER — OFFICE VISIT (OUTPATIENT)
Dept: OBSTETRICS AND GYNECOLOGY | Facility: CLINIC | Age: 23
End: 2023-05-30
Payer: MEDICAID

## 2023-05-30 ENCOUNTER — LAB VISIT (OUTPATIENT)
Dept: LAB | Facility: HOSPITAL | Age: 23
End: 2023-05-30
Attending: NURSE PRACTITIONER
Payer: MEDICAID

## 2023-05-30 VITALS
WEIGHT: 143.94 LBS | SYSTOLIC BLOOD PRESSURE: 118 MMHG | HEIGHT: 61 IN | DIASTOLIC BLOOD PRESSURE: 74 MMHG | BODY MASS INDEX: 27.18 KG/M2

## 2023-05-30 DIAGNOSIS — Z11.3 SCREENING EXAMINATION FOR STD (SEXUALLY TRANSMITTED DISEASE): ICD-10-CM

## 2023-05-30 DIAGNOSIS — N89.8 VAGINAL ODOR: Primary | ICD-10-CM

## 2023-05-30 DIAGNOSIS — N89.8 VAGINAL ITCHING: ICD-10-CM

## 2023-05-30 PROCEDURE — 99999 PR PBB SHADOW E&M-EST. PATIENT-LVL III: ICD-10-PCS | Mod: PBBFAC,,, | Performed by: NURSE PRACTITIONER

## 2023-05-30 PROCEDURE — 80074 ACUTE HEPATITIS PANEL: CPT | Performed by: NURSE PRACTITIONER

## 2023-05-30 PROCEDURE — 86592 SYPHILIS TEST NON-TREP QUAL: CPT | Performed by: NURSE PRACTITIONER

## 2023-05-30 PROCEDURE — 1160F PR REVIEW ALL MEDS BY PRESCRIBER/CLIN PHARMACIST DOCUMENTED: ICD-10-PCS | Mod: CPTII,,, | Performed by: NURSE PRACTITIONER

## 2023-05-30 PROCEDURE — 3074F SYST BP LT 130 MM HG: CPT | Mod: CPTII,,, | Performed by: NURSE PRACTITIONER

## 2023-05-30 PROCEDURE — 1159F MED LIST DOCD IN RCRD: CPT | Mod: CPTII,,, | Performed by: NURSE PRACTITIONER

## 2023-05-30 PROCEDURE — 36415 COLL VENOUS BLD VENIPUNCTURE: CPT | Performed by: NURSE PRACTITIONER

## 2023-05-30 PROCEDURE — 1160F RVW MEDS BY RX/DR IN RCRD: CPT | Mod: CPTII,,, | Performed by: NURSE PRACTITIONER

## 2023-05-30 PROCEDURE — 81514 NFCT DS BV&VAGINITIS DNA ALG: CPT | Performed by: NURSE PRACTITIONER

## 2023-05-30 PROCEDURE — 3078F PR MOST RECENT DIASTOLIC BLOOD PRESSURE < 80 MM HG: ICD-10-PCS | Mod: CPTII,,, | Performed by: NURSE PRACTITIONER

## 2023-05-30 PROCEDURE — 99214 OFFICE O/P EST MOD 30 MIN: CPT | Mod: S$PBB,,, | Performed by: NURSE PRACTITIONER

## 2023-05-30 PROCEDURE — 1159F PR MEDICATION LIST DOCUMENTED IN MEDICAL RECORD: ICD-10-PCS | Mod: CPTII,,, | Performed by: NURSE PRACTITIONER

## 2023-05-30 PROCEDURE — 87591 N.GONORRHOEAE DNA AMP PROB: CPT | Performed by: NURSE PRACTITIONER

## 2023-05-30 PROCEDURE — 3008F BODY MASS INDEX DOCD: CPT | Mod: CPTII,,, | Performed by: NURSE PRACTITIONER

## 2023-05-30 PROCEDURE — 87389 HIV-1 AG W/HIV-1&-2 AB AG IA: CPT | Performed by: NURSE PRACTITIONER

## 2023-05-30 PROCEDURE — 99214 PR OFFICE/OUTPT VISIT, EST, LEVL IV, 30-39 MIN: ICD-10-PCS | Mod: S$PBB,,, | Performed by: NURSE PRACTITIONER

## 2023-05-30 PROCEDURE — 3078F DIAST BP <80 MM HG: CPT | Mod: CPTII,,, | Performed by: NURSE PRACTITIONER

## 2023-05-30 PROCEDURE — 99999 PR PBB SHADOW E&M-EST. PATIENT-LVL III: CPT | Mod: PBBFAC,,, | Performed by: NURSE PRACTITIONER

## 2023-05-30 PROCEDURE — 3008F PR BODY MASS INDEX (BMI) DOCUMENTED: ICD-10-PCS | Mod: CPTII,,, | Performed by: NURSE PRACTITIONER

## 2023-05-30 PROCEDURE — 3074F PR MOST RECENT SYSTOLIC BLOOD PRESSURE < 130 MM HG: ICD-10-PCS | Mod: CPTII,,, | Performed by: NURSE PRACTITIONER

## 2023-05-30 PROCEDURE — 99213 OFFICE O/P EST LOW 20 MIN: CPT | Mod: PBBFAC | Performed by: NURSE PRACTITIONER

## 2023-05-30 RX ORDER — FLUCONAZOLE 150 MG/1
TABLET ORAL
COMMUNITY
Start: 2023-05-25 | End: 2023-05-30

## 2023-05-30 RX ORDER — METRONIDAZOLE 500 MG/1
500 TABLET ORAL EVERY 12 HOURS
Qty: 14 TABLET | Refills: 0 | Status: SHIPPED | OUTPATIENT
Start: 2023-05-30 | End: 2023-06-06

## 2023-05-30 RX ORDER — FLUCONAZOLE 150 MG/1
TABLET ORAL
Qty: 3 TABLET | Refills: 0 | Status: SHIPPED | OUTPATIENT
Start: 2023-05-30 | End: 2023-06-27 | Stop reason: SDUPTHER

## 2023-05-30 RX ORDER — NITROFURANTOIN 25; 75 MG/1; MG/1
100 CAPSULE ORAL 2 TIMES DAILY
COMMUNITY
Start: 2023-05-25

## 2023-05-30 NOTE — PROGRESS NOTES
Subjective:       Patient ID: Janelle Clemente is a 22 y.o. female.    Chief Complaint:  STD CHECK    Patient's last menstrual period was 2023.  History of Present Illness  Presents today for yeast and bv   Went to Urgent Care 2-3 weeks for yeast infection and was treated but she still has mild itching  and odor     OB History    Para Term  AB Living   0 0 0 0 0 0   SAB IAB Ectopic Multiple Live Births   0 0 0 0 0       Review of Systems  Review of Systems        Objective:    Physical Exam  Genitourinary:     General: Normal vulva.      Exam position: Lithotomy position.      Vagina: No signs of injury and foreign body. No vaginal discharge, erythema, tenderness, bleeding, lesions or prolapsed vaginal walls.         Assessment:     1. Vaginal odor    2. Screening examination for STD (sexually transmitted disease)    3. Vaginal itching              Plan:   Janelle was seen today for std check.    Diagnoses and all orders for this visit:    Vaginal odor  -     Vaginosis Screen by DNA Probe  -     metroNIDAZOLE (FLAGYL) 500 MG tablet; Take 1 tablet (500 mg total) by mouth every 12 (twelve) hours. for 7 days    Screening examination for STD (sexually transmitted disease)  -     C. trachomatis/N. gonorrhoeae by AMP DNA [facility]; Vagina  -     HIV 1/2 Ag/Ab (4th Gen); Future  -     RPR; Future  -     Hepatitis Panel, Acute; Future    Vaginal itching  -     fluconazole (DIFLUCAN) 150 MG Tab; Take one tablet today and may repeat every three days up to 3 doses      Return to clinic PRN

## 2023-05-31 LAB
BACTERIAL VAGINOSIS DNA: NEGATIVE
C TRACH DNA SPEC QL NAA+PROBE: NOT DETECTED
CANDIDA GLABRATA DNA: NEGATIVE
CANDIDA KRUSEI DNA: NEGATIVE
CANDIDA RRNA VAG QL PROBE: NEGATIVE
HAV IGM SERPL QL IA: NORMAL
HBV CORE IGM SERPL QL IA: NORMAL
HBV SURFACE AG SERPL QL IA: NORMAL
HCV AB SERPL QL IA: NORMAL
HIV 1+2 AB+HIV1 P24 AG SERPL QL IA: NORMAL
N GONORRHOEA DNA SPEC QL NAA+PROBE: NOT DETECTED
RPR SER QL: NORMAL
T VAGINALIS RRNA GENITAL QL PROBE: NEGATIVE

## 2023-06-18 ENCOUNTER — HOSPITAL ENCOUNTER (EMERGENCY)
Facility: HOSPITAL | Age: 23
Discharge: HOME | End: 2023-06-18
Attending: EMERGENCY MEDICINE
Payer: MEDICAID

## 2023-06-18 VITALS
OXYGEN SATURATION: 98 % | BODY MASS INDEX: 27.14 KG/M2 | WEIGHT: 143.63 LBS | SYSTOLIC BLOOD PRESSURE: 124 MMHG | TEMPERATURE: 98 F | DIASTOLIC BLOOD PRESSURE: 83 MMHG | RESPIRATION RATE: 18 BRPM | HEART RATE: 94 BPM

## 2023-06-18 DIAGNOSIS — R51.9 FRONTAL HEADACHE: Primary | ICD-10-CM

## 2023-06-18 DIAGNOSIS — K59.00 CONSTIPATION, UNSPECIFIED CONSTIPATION TYPE: ICD-10-CM

## 2023-06-18 PROCEDURE — 99283 EMERGENCY DEPT VISIT LOW MDM: CPT

## 2023-06-18 RX ORDER — BUTALBITAL, ACETAMINOPHEN AND CAFFEINE 50; 325; 40 MG/1; MG/1; MG/1
1 TABLET ORAL EVERY 6 HOURS PRN
Qty: 15 TABLET | Refills: 0 | Status: SHIPPED | OUTPATIENT
Start: 2023-06-18

## 2023-06-18 RX ORDER — POLYETHYLENE GLYCOL 3350 17 G/17G
POWDER, FOR SOLUTION ORAL
Qty: 289 G | Refills: 0 | Status: SHIPPED | OUTPATIENT
Start: 2023-06-18

## 2023-06-18 NOTE — ED PROVIDER NOTES
Encounter Date: 6/18/2023       History     Chief Complaint   Patient presents with    Headache     Pt c/o bad headache since Wednesday. Has taken advil and excedrin all week with no improvement. States she thinks she has an ear infection.     Constipation     C/o constipation Xweek. LBM today, soft stools. States she doesn't feel like she's had a complete BM in a while. Took miralax at home.      22 year old female with complaint of constipation over the past couple of days but pt reports she had a bowel movement this morning.  No vomiting. Pt also reports headache over the past few days. No cough or congestion.  No runny nose. NO sore throat.       Review of patient's allergies indicates:  No Known Allergies  No past medical history on file.  No past surgical history on file.  Family History   Problem Relation Age of Onset    Breast cancer Neg Hx     Colon cancer Neg Hx     Ovarian cancer Neg Hx     Thrombosis Neg Hx      Social History     Tobacco Use    Smoking status: Never    Smokeless tobacco: Never   Substance Use Topics    Alcohol use: Never    Drug use: Never     Review of Systems   Constitutional:  Negative for fever.   HENT:  Positive for congestion. Negative for sore throat.    Respiratory:  Positive for cough. Negative for shortness of breath.    Cardiovascular:  Negative for chest pain.   Gastrointestinal:  Negative for nausea.   Genitourinary:  Negative for dysuria.   Musculoskeletal:  Negative for back pain.   Skin:  Negative for rash.   Neurological:  Negative for weakness.   Hematological:  Does not bruise/bleed easily.     Physical Exam     Initial Vitals [06/18/23 1134]   BP Pulse Resp Temp SpO2   124/83 94 18 98.2 °F (36.8 °C) 98 %      MAP       --         Physical Exam    Nursing note and vitals reviewed.  Constitutional: She appears well-developed and well-nourished.   HENT:   Head: Normocephalic and atraumatic.   Eyes: Conjunctivae and EOM are normal. Pupils are equal, round, and reactive  to light.   Neck: Neck supple.   Normal range of motion.  Cardiovascular:  Normal rate, regular rhythm, normal heart sounds and intact distal pulses.           Pulmonary/Chest: Breath sounds normal.   Abdominal: Abdomen is soft. There is no abdominal tenderness. There is no rebound and no guarding.   Musculoskeletal:         General: Normal range of motion.      Cervical back: Normal range of motion and neck supple.     Neurological: She is alert and oriented to person, place, and time. She has normal strength and normal reflexes.   Skin: Skin is warm and dry.   Psychiatric: She has a normal mood and affect. Her behavior is normal. Thought content normal.       ED Course   Procedures  Labs Reviewed - No data to display       Imaging Results    None          Medications - No data to display                           Clinical Impression:   Final diagnoses:  [R51.9] Frontal headache (Primary)  [K59.00] Constipation, unspecified constipation type        ED Disposition Condition    Discharge Stable          ED Prescriptions       Medication Sig Dispense Start Date End Date Auth. Provider    butalbital-acetaminophen-caffeine -40 mg (FIORICET, ESGIC) -40 mg per tablet Take 1 tablet by mouth every 6 (six) hours as needed for Headaches. 15 tablet 6/18/2023 -- Mack Sauer NP    polyethylene glycol (GLYCOLAX) 17 gram/dose powder 17 gram daily PO prn constipation 289 g 6/18/2023 -- Mack Sauer NP          Follow-up Information       Follow up With Specialties Details Why Contact Info    Dasia Mijares MD Family Medicine Schedule an appointment as soon as possible for a visit in 2 days  [address] [facility] [address]  [phone]               Mack Sauer NP  06/18/23 8043

## 2023-06-27 DIAGNOSIS — N89.8 VAGINAL ITCHING: ICD-10-CM

## 2023-06-27 RX ORDER — FLUCONAZOLE 150 MG/1
TABLET ORAL
Qty: 3 TABLET | Refills: 0 | Status: SHIPPED | OUTPATIENT
Start: 2023-06-27 | End: 2023-10-09

## 2023-07-17 ENCOUNTER — TELEPHONE (OUTPATIENT)
Dept: PRIMARY CARE CLINIC | Facility: CLINIC | Age: 23
End: 2023-07-17
Payer: MEDICAID

## 2023-07-17 NOTE — TELEPHONE ENCOUNTER
----- Message from Kamryn Ceja sent at 7/14/2023  4:27 PM CDT -----  Regarding: referral request  Name of Who is Calling:CHARLETTE ZUÑIGA [[id number]]          What is the request in detail: pt would like a orthopedic referral           Can the clinic reply by [facility]: no           What Number to Call Back if not in [facility]: [phone] (home)

## 2023-07-21 ENCOUNTER — HOSPITAL ENCOUNTER (OUTPATIENT)
Dept: RADIOLOGY | Facility: HOSPITAL | Age: 23
Discharge: HOME | End: 2023-07-21
Attending: NURSE PRACTITIONER
Payer: MEDICAID

## 2023-07-21 ENCOUNTER — OFFICE VISIT (OUTPATIENT)
Dept: PRIMARY CARE CLINIC | Facility: CLINIC | Age: 23
End: 2023-07-21
Payer: MEDICAID

## 2023-07-21 VITALS
OXYGEN SATURATION: 98 % | SYSTOLIC BLOOD PRESSURE: 110 MMHG | HEART RATE: 82 BPM | TEMPERATURE: 98 F | BODY MASS INDEX: 28.16 KG/M2 | WEIGHT: 149.06 LBS | DIASTOLIC BLOOD PRESSURE: 64 MMHG

## 2023-07-21 DIAGNOSIS — M25.562 ACUTE PAIN OF LEFT KNEE: ICD-10-CM

## 2023-07-21 DIAGNOSIS — M25.562 ACUTE PAIN OF LEFT KNEE: Primary | ICD-10-CM

## 2023-07-21 PROCEDURE — 1160F RVW MEDS BY RX/DR IN RCRD: CPT | Mod: CPTII,,, | Performed by: NURSE PRACTITIONER

## 2023-07-21 PROCEDURE — 3074F PR MOST RECENT SYSTOLIC BLOOD PRESSURE < 130 MM HG: ICD-10-PCS | Mod: CPTII,,, | Performed by: NURSE PRACTITIONER

## 2023-07-21 PROCEDURE — 1160F PR REVIEW ALL MEDS BY PRESCRIBER/CLIN PHARMACIST DOCUMENTED: ICD-10-PCS | Mod: CPTII,,, | Performed by: NURSE PRACTITIONER

## 2023-07-21 PROCEDURE — 99214 OFFICE O/P EST MOD 30 MIN: CPT | Mod: S$PBB,,, | Performed by: NURSE PRACTITIONER

## 2023-07-21 PROCEDURE — 99999 PR PBB SHADOW E&M-EST. PATIENT-LVL V: ICD-10-PCS | Mod: PBBFAC,,, | Performed by: NURSE PRACTITIONER

## 2023-07-21 PROCEDURE — 3078F DIAST BP <80 MM HG: CPT | Mod: CPTII,,, | Performed by: NURSE PRACTITIONER

## 2023-07-21 PROCEDURE — 3074F SYST BP LT 130 MM HG: CPT | Mod: CPTII,,, | Performed by: NURSE PRACTITIONER

## 2023-07-21 PROCEDURE — 1159F MED LIST DOCD IN RCRD: CPT | Mod: CPTII,,, | Performed by: NURSE PRACTITIONER

## 2023-07-21 PROCEDURE — 3008F PR BODY MASS INDEX (BMI) DOCUMENTED: ICD-10-PCS | Mod: CPTII,,, | Performed by: NURSE PRACTITIONER

## 2023-07-21 PROCEDURE — 99999 PR PBB SHADOW E&M-EST. PATIENT-LVL V: CPT | Mod: PBBFAC,,, | Performed by: NURSE PRACTITIONER

## 2023-07-21 PROCEDURE — 3078F PR MOST RECENT DIASTOLIC BLOOD PRESSURE < 80 MM HG: ICD-10-PCS | Mod: CPTII,,, | Performed by: NURSE PRACTITIONER

## 2023-07-21 PROCEDURE — 73562 X-RAY EXAM OF KNEE 3: CPT | Mod: 26,LT,, | Performed by: RADIOLOGY

## 2023-07-21 PROCEDURE — 73560 X-RAY EXAM OF KNEE 1 OR 2: CPT | Mod: TC,RT

## 2023-07-21 PROCEDURE — 99214 PR OFFICE/OUTPT VISIT, EST, LEVL IV, 30-39 MIN: ICD-10-PCS | Mod: S$PBB,,, | Performed by: NURSE PRACTITIONER

## 2023-07-21 PROCEDURE — 1159F PR MEDICATION LIST DOCUMENTED IN MEDICAL RECORD: ICD-10-PCS | Mod: CPTII,,, | Performed by: NURSE PRACTITIONER

## 2023-07-21 PROCEDURE — 3008F BODY MASS INDEX DOCD: CPT | Mod: CPTII,,, | Performed by: NURSE PRACTITIONER

## 2023-07-21 PROCEDURE — 73560 X-RAY EXAM OF KNEE 1 OR 2: CPT | Mod: 26,RT,, | Performed by: RADIOLOGY

## 2023-07-21 PROCEDURE — 99215 OFFICE O/P EST HI 40 MIN: CPT | Mod: PBBFAC,PN | Performed by: NURSE PRACTITIONER

## 2023-07-21 PROCEDURE — 73562 XR KNEE ORTHO LEFT: ICD-10-PCS | Mod: 26,LT,, | Performed by: RADIOLOGY

## 2023-07-21 PROCEDURE — 73560 XR KNEE ORTHO LEFT: ICD-10-PCS | Mod: 26,RT,, | Performed by: RADIOLOGY

## 2023-07-21 RX ORDER — BACLOFEN 10 MG/1
10 TABLET ORAL 3 TIMES DAILY PRN
Qty: 90 TABLET | Refills: 0 | Status: SHIPPED | OUTPATIENT
Start: 2023-07-21 | End: 2024-07-20

## 2023-07-21 NOTE — PROGRESS NOTES
Chief Complaint  No chief complaint on file.        HPI     HPI  Janelle Clemente is a 22 y.o. female with medical diagnoses as listed in the medical history and problem list that presents for Left Knee Pain. This patient is new to me.     Left Knee Pain: Fell downstairs approx a knee ago while attempting to move furniture. She held off on screening for knee deviations because given she sustained an right ankle injury during same fall that was more painful. Works as a veternary technician. As of recently she had been kneeling on the floor tending to a dog when her knee locked during that same moment the dog shifted her knee, thereafter she experienced a popping sound. She then attempted ROM exercises where Knee continued to pop with sharp pain. Rates pain 5/10. Pain is usually 10/10 at work when kneeling or lifting on animals. Tylenol gardner snot provide relief. Can sometimes wake up stiff in the morning        History     PAST MEDICAL HISTORY:  History reviewed. No pertinent past medical history.    PAST SURGICAL HISTORY:  History reviewed. No pertinent surgical history.    SOCIAL HISTORY:  Social History     Socioeconomic History    Marital status: Single   Occupational History     Comment: student, [employer] , at [employer]    Tobacco Use    Smoking status: Never    Smokeless tobacco: Never   Substance and Sexual Activity    Alcohol use: Never    Drug use: Never    Sexual activity: Yes     Partners: Male     Birth control/protection: Implant       FAMILY HISTORY:  Family History   Problem Relation Age of Onset    Breast cancer Neg Hx     Colon cancer Neg Hx     Ovarian cancer Neg Hx     Thrombosis Neg Hx        ALLERGIES AND MEDICATIONS: updated and reviewed.  Review of patient's allergies indicates:  No Known Allergies  Current Outpatient Medications   Medication Sig Dispense Refill    butalbital-acetaminophen-caffeine -40 mg (FIORICET, ESGIC) -40 mg per tablet Take 1 tablet by mouth every 6 (six)  hours as needed for Headaches. 15 tablet 0    etonogestrel (NEXPLANON SDRM) by Subdermal route.      ALAHIST PE 2-7.5 mg Tab Take 1 tablet by mouth every 6 (six) hours as needed.      amoxicillin (AMOXIL) 875 MG tablet SMARTSI Tablet(s) By Mouth Every 12 Hours      baclofen (LIORESAL) 10 MG tablet Take 1 tablet (10 mg total) by mouth 3 (three) times daily as needed. 90 tablet 0    fluconazole (DIFLUCAN) 150 MG Tab Take one tablet today and may repeat every three days up to 3 doses (Patient not taking: Reported on 2023) 3 tablet 0    nitrofurantoin, macrocrystal-monohydrate, (MACROBID) 100 MG capsule Take 100 mg by mouth 2 (two) times daily.      polyethylene glycol (GLYCOLAX) 17 gram/dose powder 17 gram daily PO prn constipation (Patient not taking: Reported on 2023) 289 g 0    predniSONE (DELTASONE) 10 MG tablet Take 10 mg by mouth 2 (two) times daily as needed.       Current Facility-Administered Medications   Medication Dose Route Frequency Provider Last Rate Last Admin    etonogestreL subdermal device 68 mg  68 mg Implant  Jackie Rodriguez NP   68 mg at 22 1400           Exam     ROS  Review of Systems   Constitutional:  Negative for appetite change, chills, fatigue and fever.   HENT:  Negative for congestion, ear pain, postnasal drip, rhinorrhea, sinus pressure, sneezing and sore throat.    Respiratory:  Negative for shortness of breath.    Cardiovascular:  Negative for chest pain and palpitations.   Gastrointestinal:  Negative for abdominal pain, constipation, diarrhea, nausea and vomiting.   Genitourinary:  Negative for dysuria.   Musculoskeletal:  Positive for arthralgias and myalgias.   Neurological:  Negative for headaches.   Psychiatric/Behavioral:  Negative for sleep disturbance.          Physical Exam  Vitals:    23 1055   BP: 110/64   Pulse: 82   Temp: 97.6 °F (36.4 °C)   SpO2: 98%   Weight: 67.6 kg (149 lb 0.5 oz)    Body mass index is 28.16 kg/m².  Weight: 67.6 kg (149 lb 0.5  oz)       Physical Exam  Constitutional:       General: She is not in acute distress.     Appearance: Normal appearance.   HENT:      Head: Normocephalic and atraumatic.      Right Ear: External ear normal.      Left Ear: External ear normal.      Nose: Nose normal.   Eyes:      Pupils: Pupils are equal, round, and reactive to light.   Cardiovascular:      Rate and Rhythm: Normal rate and regular rhythm.   Pulmonary:      Effort: Pulmonary effort is normal. No respiratory distress.      Breath sounds: Normal breath sounds. No wheezing.   Abdominal:      General: Bowel sounds are normal.      Palpations: Abdomen is soft.   Musculoskeletal:      Cervical back: Neck supple.      Left knee: Tenderness present.   Lymphadenopathy:      Cervical: No cervical adenopathy.   Skin:     General: Skin is warm and dry.   Neurological:      Mental Status: She is alert and oriented to person, place, and time.           Health Maintenance         Date Due Completion Date    Lipid Panel Never done ---    COVID-19 Vaccine (1) Never done ---    Influenza Vaccine (1) 09/01/2023 10/6/2016    Chlamydia Screening 05/30/2024 5/30/2023    Pap Smear 11/18/2025 11/18/2022    TETANUS VACCINE 10/05/2029 10/5/2019              Assessment & Plan     Assessment & Plan  Problem List Items Addressed This Visit    None  Visit Diagnoses       Acute pain of left knee    -  Primary  - Advised to use OTC Tylenol or Ibuprofen for pain   -We discussed effective administration of Baclofen, adverse effects and side effects     Relevant Medications    baclofen (LIORESAL) 10 MG tablet    Other Relevant Orders    X-ray Knee Ortho Left (Completed)    Ambulatory referral/consult to Orthopedics    Ambulatory referral/consult to Orthopedics              Health Maintenance reviewed: Deferred at this time    Follow-up: If symptoms worsen or fail to improve     30+ minutes of total time spent on the encounter, which includes face to face time and non-face to face time  preparing to see the patient (eg, review of tests), Obtaining and/or reviewing separately obtained history, documenting clinical information in the electronic or other health record, independently interpreting results (not separately reported) and communicating results to the patient/family/caregiver, or Care coordination (not separately reported).

## 2023-09-13 ENCOUNTER — TELEPHONE (OUTPATIENT)
Dept: OBSTETRICS AND GYNECOLOGY | Facility: CLINIC | Age: 23
End: 2023-09-13
Payer: MEDICAID

## 2023-09-13 NOTE — TELEPHONE ENCOUNTER
----- Message from Marisol Gordon sent at 9/13/2023  7:40 AM CDT -----  Pt would like to schedule an appt today to have birth control removed. Call back number is .[phone]. Thx. EL

## 2023-09-14 ENCOUNTER — TELEPHONE (OUTPATIENT)
Dept: OBSTETRICS AND GYNECOLOGY | Facility: CLINIC | Age: 23
End: 2023-09-14
Payer: MEDICAID

## 2023-09-14 NOTE — TELEPHONE ENCOUNTER
----- Message from Niall Andres sent at 9/13/2023  4:51 PM CDT -----  Contact: Janelle  Patient is calling to see if there is an earlier time for procedure on 9/18/23. Please give patient a call at [phone]

## 2023-09-14 NOTE — TELEPHONE ENCOUNTER
Left message for patient to return call to [phone].    There are earlier times available on 9/18 as of right now.

## 2023-10-09 ENCOUNTER — PROCEDURE VISIT (OUTPATIENT)
Dept: OBSTETRICS AND GYNECOLOGY | Facility: CLINIC | Age: 23
End: 2023-10-09
Payer: MEDICAID

## 2023-10-09 ENCOUNTER — LAB VISIT (OUTPATIENT)
Dept: LAB | Facility: HOSPITAL | Age: 23
End: 2023-10-09
Attending: NURSE PRACTITIONER
Payer: MEDICAID

## 2023-10-09 VITALS
HEIGHT: 61 IN | BODY MASS INDEX: 26.98 KG/M2 | SYSTOLIC BLOOD PRESSURE: 112 MMHG | DIASTOLIC BLOOD PRESSURE: 68 MMHG | WEIGHT: 142.88 LBS

## 2023-10-09 DIAGNOSIS — Z11.3 SCREENING EXAMINATION FOR STD (SEXUALLY TRANSMITTED DISEASE): ICD-10-CM

## 2023-10-09 DIAGNOSIS — Z30.46 NEXPLANON REMOVAL: Primary | ICD-10-CM

## 2023-10-09 PROCEDURE — 99213 OFFICE O/P EST LOW 20 MIN: CPT | Mod: 25,S$PBB,, | Performed by: NURSE PRACTITIONER

## 2023-10-09 PROCEDURE — 87591 N.GONORRHOEAE DNA AMP PROB: CPT | Performed by: NURSE PRACTITIONER

## 2023-10-09 PROCEDURE — 80074 ACUTE HEPATITIS PANEL: CPT | Performed by: NURSE PRACTITIONER

## 2023-10-09 PROCEDURE — 11982 REMOVAL OF NEXPLANON DEVICE: ICD-10-PCS | Mod: S$PBB,,, | Performed by: NURSE PRACTITIONER

## 2023-10-09 PROCEDURE — 99213 PR OFFICE/OUTPT VISIT, EST, LEVL III, 20-29 MIN: ICD-10-PCS | Mod: 25,S$PBB,, | Performed by: NURSE PRACTITIONER

## 2023-10-09 PROCEDURE — 87389 HIV-1 AG W/HIV-1&-2 AB AG IA: CPT | Performed by: NURSE PRACTITIONER

## 2023-10-09 PROCEDURE — 86592 SYPHILIS TEST NON-TREP QUAL: CPT | Performed by: NURSE PRACTITIONER

## 2023-10-09 PROCEDURE — 11982 REMOVE DRUG IMPLANT DEVICE: CPT | Mod: PBBFAC | Performed by: NURSE PRACTITIONER

## 2023-10-09 PROCEDURE — 87491 CHLMYD TRACH DNA AMP PROBE: CPT | Performed by: NURSE PRACTITIONER

## 2023-10-09 PROCEDURE — 36415 COLL VENOUS BLD VENIPUNCTURE: CPT | Performed by: NURSE PRACTITIONER

## 2023-10-09 PROCEDURE — 11982 REMOVE DRUG IMPLANT DEVICE: CPT | Mod: S$PBB,,, | Performed by: NURSE PRACTITIONER

## 2023-10-09 NOTE — PROCEDURES
Removal of Nexplanon Device    Date/Time: 10/9/2023 9:30 AM    Performed by: Jackie Rodriguez NP  Authorized by: Jackie Rodriguez NP    Consent obtained:  Verbal  Consent given by:  Patient  Procedure risks and benefits discussed: yes    Patient questions answered: yes    Patient agrees, verbalizes understanding, and wants to proceed: yes    Educational handouts given: yes    Instructions and paperwork completed: yes    Implant grasped by: hemostat  Other reason for removal:  Csatro not like  Removed with no complications: yes  no  Arm: left  Palpation confirms location: yes  Small stab incision was made in arm: yes  Upon removal device was intact: yes  Site was close with steri-strips and pressure bandage applied: yes  Pre-procedure timeout performed: yes  Local anesthetic:  Lidocaine with epinephrine   The site was cleaned  and prepped in a sterile fashion: yes  Specimen sent to pathology: Yes

## 2023-12-27 ENCOUNTER — OFFICE VISIT (OUTPATIENT)
Dept: OBSTETRICS AND GYNECOLOGY | Facility: CLINIC | Age: 23
End: 2023-12-27
Payer: MEDICAID

## 2023-12-27 ENCOUNTER — LAB VISIT (OUTPATIENT)
Dept: LAB | Facility: HOSPITAL | Age: 23
End: 2023-12-27
Attending: NURSE PRACTITIONER
Payer: MEDICAID

## 2023-12-27 VITALS
HEIGHT: 61 IN | DIASTOLIC BLOOD PRESSURE: 68 MMHG | BODY MASS INDEX: 26.1 KG/M2 | SYSTOLIC BLOOD PRESSURE: 114 MMHG | WEIGHT: 138.25 LBS

## 2023-12-27 DIAGNOSIS — Z12.4 PAPANICOLAOU SMEAR FOR CERVICAL CANCER SCREENING: ICD-10-CM

## 2023-12-27 DIAGNOSIS — N89.8 VAGINAL ODOR: ICD-10-CM

## 2023-12-27 DIAGNOSIS — Z11.3 SCREENING EXAMINATION FOR STD (SEXUALLY TRANSMITTED DISEASE): ICD-10-CM

## 2023-12-27 DIAGNOSIS — Z01.419 ROUTINE GYNECOLOGICAL EXAMINATION: Primary | ICD-10-CM

## 2023-12-27 DIAGNOSIS — N89.8 VAGINAL ITCHING: ICD-10-CM

## 2023-12-27 PROCEDURE — 3008F BODY MASS INDEX DOCD: CPT | Mod: CPTII,,, | Performed by: NURSE PRACTITIONER

## 2023-12-27 PROCEDURE — 3078F PR MOST RECENT DIASTOLIC BLOOD PRESSURE < 80 MM HG: ICD-10-PCS | Mod: CPTII,,, | Performed by: NURSE PRACTITIONER

## 2023-12-27 PROCEDURE — 99213 OFFICE O/P EST LOW 20 MIN: CPT | Mod: PBBFAC | Performed by: NURSE PRACTITIONER

## 2023-12-27 PROCEDURE — 80074 ACUTE HEPATITIS PANEL: CPT | Performed by: NURSE PRACTITIONER

## 2023-12-27 PROCEDURE — 3074F PR MOST RECENT SYSTOLIC BLOOD PRESSURE < 130 MM HG: ICD-10-PCS | Mod: CPTII,,, | Performed by: NURSE PRACTITIONER

## 2023-12-27 PROCEDURE — 3074F SYST BP LT 130 MM HG: CPT | Mod: CPTII,,, | Performed by: NURSE PRACTITIONER

## 2023-12-27 PROCEDURE — 3078F DIAST BP <80 MM HG: CPT | Mod: CPTII,,, | Performed by: NURSE PRACTITIONER

## 2023-12-27 PROCEDURE — 99395 PR PREVENTIVE VISIT,EST,18-39: ICD-10-PCS | Mod: S$PBB,,, | Performed by: NURSE PRACTITIONER

## 2023-12-27 PROCEDURE — 86592 SYPHILIS TEST NON-TREP QUAL: CPT | Performed by: NURSE PRACTITIONER

## 2023-12-27 PROCEDURE — 1159F PR MEDICATION LIST DOCUMENTED IN MEDICAL RECORD: ICD-10-PCS | Mod: CPTII,,, | Performed by: NURSE PRACTITIONER

## 2023-12-27 PROCEDURE — 1160F RVW MEDS BY RX/DR IN RCRD: CPT | Mod: CPTII,,, | Performed by: NURSE PRACTITIONER

## 2023-12-27 PROCEDURE — 99999 PR PBB SHADOW E&M-EST. PATIENT-LVL III: ICD-10-PCS | Mod: PBBFAC,,, | Performed by: NURSE PRACTITIONER

## 2023-12-27 PROCEDURE — 88175 CYTOPATH C/V AUTO FLUID REDO: CPT | Performed by: NURSE PRACTITIONER

## 2023-12-27 PROCEDURE — 36415 COLL VENOUS BLD VENIPUNCTURE: CPT | Performed by: NURSE PRACTITIONER

## 2023-12-27 PROCEDURE — 99395 PREV VISIT EST AGE 18-39: CPT | Mod: S$PBB,,, | Performed by: NURSE PRACTITIONER

## 2023-12-27 PROCEDURE — 99999 PR PBB SHADOW E&M-EST. PATIENT-LVL III: CPT | Mod: PBBFAC,,, | Performed by: NURSE PRACTITIONER

## 2023-12-27 PROCEDURE — 3008F PR BODY MASS INDEX (BMI) DOCUMENTED: ICD-10-PCS | Mod: CPTII,,, | Performed by: NURSE PRACTITIONER

## 2023-12-27 PROCEDURE — 1160F PR REVIEW ALL MEDS BY PRESCRIBER/CLIN PHARMACIST DOCUMENTED: ICD-10-PCS | Mod: CPTII,,, | Performed by: NURSE PRACTITIONER

## 2023-12-27 PROCEDURE — 87389 HIV-1 AG W/HIV-1&-2 AB AG IA: CPT | Performed by: NURSE PRACTITIONER

## 2023-12-27 PROCEDURE — 87591 N.GONORRHOEAE DNA AMP PROB: CPT | Performed by: NURSE PRACTITIONER

## 2023-12-27 PROCEDURE — 1159F MED LIST DOCD IN RCRD: CPT | Mod: CPTII,,, | Performed by: NURSE PRACTITIONER

## 2023-12-27 RX ORDER — FLUCONAZOLE 150 MG/1
TABLET ORAL
Qty: 3 TABLET | Refills: 0 | Status: SHIPPED | OUTPATIENT
Start: 2023-12-27

## 2023-12-27 RX ORDER — METRONIDAZOLE 500 MG/1
500 TABLET ORAL EVERY 12 HOURS
Qty: 14 TABLET | Refills: 0 | Status: SHIPPED | OUTPATIENT
Start: 2023-12-27 | End: 2024-01-03

## 2023-12-27 RX ORDER — DICLOFENAC SODIUM 10 MG/G
2 GEL TOPICAL
COMMUNITY
Start: 2023-08-03

## 2023-12-27 NOTE — PROGRESS NOTES
"  Subjective:       Patient ID: Janelle Clemente is a 23 y.o. female.    Chief Complaint:  Well Woman and Annual Exam      History of Present Illness  HPI  Complains of vaginal discharge with odor   Desires std screening   Health Maintenance   Topic Date Due    Lipid Panel  Never done    Chlamydia Screening  10/09/2024    Pap Smear  2025    TETANUS VACCINE  10/05/2029    Hepatitis C Screening  Completed    HPV Vaccines  Completed     GYN & OB History  Patient's last menstrual period was 2023.   Date of Last Pap: 2022    OB History    Para Term  AB Living   0 0 0 0 0 0   SAB IAB Ectopic Multiple Live Births   0 0 0 0 0     gc  Review of Systems  Review of Systems        Objective:   /68   Ht 5' 1" (1.549 m)   Wt 62.7 kg (138 lb 3.7 oz)   LMP 2023   BMI 26.12 kg/m²    Physical Exam:               Genitourinary:    Inguinal canal normal.      Pelvic exam was performed with patient in the lithotomy position.   The external female genitalia was normal.   Genitalia hair distrobution normal .     Labial bartholins normal.Cervix is normal. Vagina exhibits no lesion. No erythema, vaginal discharge, tenderness, bleeding, rectocele, cystocele or prolapse of vaginal walls in the vagina.    No foreign body in the vagina.      No signs of injury in the vagina.      pap smear completed                     Assessment:        1. Routine gynecological examination    2. Screening examination for STD (sexually transmitted disease)    3. Papanicolaou smear for cervical cancer screening    4. Vaginal odor    5. Vaginal itching                Plan:            Janelle was seen today for well woman and annual exam.    Diagnoses and all orders for this visit:    Routine gynecological examination    Screening examination for STD (sexually transmitted disease)  -     C. trachomatis/N. gonorrhoeae by AMP DNA [facility]; Vagina  -     HIV 1/2 Ag/Ab (4th Gen); Future  -     RPR; Future  -     " Hepatitis Panel, Acute; Future    Papanicolaou smear for cervical cancer screening  -     Liquid-Based Pap Smear, Screening    Vaginal odor  -     metroNIDAZOLE (FLAGYL) 500 MG tablet; Take 1 tablet (500 mg total) by mouth every 12 (twelve) hours. for 7 days    Vaginal itching  -     fluconazole (DIFLUCAN) 150 MG Tab; Take one tablet today and may repeat every three days up to 3 doses    Return to clinic for WWE

## 2023-12-28 LAB
C TRACH DNA SPEC QL NAA+PROBE: NOT DETECTED
HAV IGM SERPL QL IA: NORMAL
HBV CORE IGM SERPL QL IA: NORMAL
HBV SURFACE AG SERPL QL IA: NORMAL
HCV AB SERPL QL IA: NORMAL
HIV 1+2 AB+HIV1 P24 AG SERPL QL IA: NORMAL
N GONORRHOEA DNA SPEC QL NAA+PROBE: NOT DETECTED
RPR SER QL: NORMAL

## 2024-01-03 ENCOUNTER — PATIENT MESSAGE (OUTPATIENT)
Dept: OBSTETRICS AND GYNECOLOGY | Facility: CLINIC | Age: 24
End: 2024-01-03

## 2024-04-24 ENCOUNTER — TELEPHONE (OUTPATIENT)
Dept: PRIMARY CARE CLINIC | Facility: CLINIC | Age: 24
End: 2024-04-24

## 2024-04-24 NOTE — TELEPHONE ENCOUNTER
----- Message from Sheba Donnelly sent at 4/24/2024 10:16 AM CDT -----  Regarding: soon appt  Contact: Janelle  Type:  Sooner Apoointment Request    Caller is requesting a sooner appointment.  Caller declined first available appointment listed below.  Caller will not accept being placed on the waitlist and is requesting a message be sent to doctor.  Name of Caller: Janelle  When is the first available appointment?   Symptoms:  Would the patient rather a call back or a response via [facility]? call  Best Call Back Number: [phone] (home)    Additional Information:  Janelle want to come to consult about recent diagnosis she received and her annual visit. She has Medicaid and Epic did not show a date to offer.

## 2024-04-29 ENCOUNTER — LAB VISIT (OUTPATIENT)
Dept: LAB | Facility: HOSPITAL | Age: 24
End: 2024-04-29
Attending: FAMILY MEDICINE
Payer: MEDICAID

## 2024-04-29 ENCOUNTER — OFFICE VISIT (OUTPATIENT)
Dept: PRIMARY CARE CLINIC | Facility: CLINIC | Age: 24
End: 2024-04-29
Payer: MEDICAID

## 2024-04-29 VITALS
DIASTOLIC BLOOD PRESSURE: 62 MMHG | SYSTOLIC BLOOD PRESSURE: 110 MMHG | WEIGHT: 146.06 LBS | OXYGEN SATURATION: 98 % | BODY MASS INDEX: 27.58 KG/M2 | HEIGHT: 61 IN | TEMPERATURE: 98 F | HEART RATE: 81 BPM | RESPIRATION RATE: 18 BRPM

## 2024-04-29 DIAGNOSIS — G43.809 OTHER MIGRAINE WITHOUT STATUS MIGRAINOSUS, NOT INTRACTABLE: ICD-10-CM

## 2024-04-29 DIAGNOSIS — M25.562 RECURRENT PAIN OF LEFT KNEE: Primary | ICD-10-CM

## 2024-04-29 DIAGNOSIS — K59.00 CONSTIPATION, UNSPECIFIED CONSTIPATION TYPE: ICD-10-CM

## 2024-04-29 DIAGNOSIS — M25.562 ACUTE PAIN OF LEFT KNEE: ICD-10-CM

## 2024-04-29 DIAGNOSIS — Z00.00 ROUTINE GENERAL MEDICAL EXAMINATION AT A HEALTH CARE FACILITY: ICD-10-CM

## 2024-04-29 DIAGNOSIS — R41.840 CONCENTRATION DEFICIT: ICD-10-CM

## 2024-04-29 PROBLEM — G43.909 MIGRAINE WITHOUT STATUS MIGRAINOSUS, NOT INTRACTABLE: Status: ACTIVE | Noted: 2024-04-29

## 2024-04-29 LAB
ALBUMIN SERPL BCP-MCNC: 3.9 G/DL (ref 3.5–5.2)
ALP SERPL-CCNC: 64 U/L (ref 55–135)
ALT SERPL W/O P-5'-P-CCNC: 13 U/L (ref 10–44)
ANION GAP SERPL CALC-SCNC: 12 MMOL/L (ref 8–16)
AST SERPL-CCNC: 18 U/L (ref 10–40)
BASOPHILS # BLD AUTO: 0.03 K/UL (ref 0–0.2)
BASOPHILS NFR BLD: 0.6 % (ref 0–1.9)
BILIRUB SERPL-MCNC: 0.7 MG/DL (ref 0.1–1)
BUN SERPL-MCNC: 14 MG/DL (ref 6–20)
CALCIUM SERPL-MCNC: 9.6 MG/DL (ref 8.7–10.5)
CHLORIDE SERPL-SCNC: 105 MMOL/L (ref 95–110)
CHOLEST SERPL-MCNC: 158 MG/DL (ref 120–199)
CHOLEST/HDLC SERPL: 3.2 {RATIO} (ref 2–5)
CO2 SERPL-SCNC: 21 MMOL/L (ref 23–29)
CREAT SERPL-MCNC: 0.8 MG/DL (ref 0.5–1.4)
DIFFERENTIAL METHOD BLD: ABNORMAL
EOSINOPHIL # BLD AUTO: 0.2 K/UL (ref 0–0.5)
EOSINOPHIL NFR BLD: 4 % (ref 0–8)
ERYTHROCYTE [DISTWIDTH] IN BLOOD BY AUTOMATED COUNT: 11.6 % (ref 11.5–14.5)
EST. GFR  (NO RACE VARIABLE): >60 ML/MIN/1.73 M^2
ESTIMATED AVG GLUCOSE: 91 MG/DL (ref 68–131)
GLUCOSE SERPL-MCNC: 76 MG/DL (ref 70–110)
HBA1C MFR BLD: 4.8 % (ref 4–5.6)
HCT VFR BLD AUTO: 38.2 % (ref 37–48.5)
HDLC SERPL-MCNC: 50 MG/DL (ref 40–75)
HDLC SERPL: 31.6 % (ref 20–50)
HGB BLD-MCNC: 13.1 G/DL (ref 12–16)
IMM GRANULOCYTES # BLD AUTO: 0.01 K/UL (ref 0–0.04)
IMM GRANULOCYTES NFR BLD AUTO: 0.2 % (ref 0–0.5)
LDLC SERPL CALC-MCNC: 87.6 MG/DL (ref 63–159)
LYMPHOCYTES # BLD AUTO: 2 K/UL (ref 1–4.8)
LYMPHOCYTES NFR BLD: 40.8 % (ref 18–48)
MCH RBC QN AUTO: 31.9 PG (ref 27–31)
MCHC RBC AUTO-ENTMCNC: 34.3 G/DL (ref 32–36)
MCV RBC AUTO: 93 FL (ref 82–98)
MONOCYTES # BLD AUTO: 0.3 K/UL (ref 0.3–1)
MONOCYTES NFR BLD: 5.2 % (ref 4–15)
NEUTROPHILS # BLD AUTO: 2.5 K/UL (ref 1.8–7.7)
NEUTROPHILS NFR BLD: 49.2 % (ref 38–73)
NONHDLC SERPL-MCNC: 108 MG/DL
NRBC BLD-RTO: 0 /100 WBC
PLATELET # BLD AUTO: 395 K/UL (ref 150–450)
PMV BLD AUTO: 10.5 FL (ref 9.2–12.9)
POTASSIUM SERPL-SCNC: 4.1 MMOL/L (ref 3.5–5.1)
PROT SERPL-MCNC: 7 G/DL (ref 6–8.4)
RBC # BLD AUTO: 4.11 M/UL (ref 4–5.4)
SODIUM SERPL-SCNC: 138 MMOL/L (ref 136–145)
T4 FREE SERPL-MCNC: 1.01 NG/DL (ref 0.71–1.51)
TRIGL SERPL-MCNC: 102 MG/DL (ref 30–150)
TSH SERPL DL<=0.005 MIU/L-ACNC: 0.83 UIU/ML (ref 0.4–4)
WBC # BLD AUTO: 5 K/UL (ref 3.9–12.7)

## 2024-04-29 PROCEDURE — G2211 COMPLEX E/M VISIT ADD ON: HCPCS | Mod: S$PBB,,, | Performed by: FAMILY MEDICINE

## 2024-04-29 PROCEDURE — 85025 COMPLETE CBC W/AUTO DIFF WBC: CPT | Performed by: FAMILY MEDICINE

## 2024-04-29 PROCEDURE — 80061 LIPID PANEL: CPT | Performed by: FAMILY MEDICINE

## 2024-04-29 PROCEDURE — 36415 COLL VENOUS BLD VENIPUNCTURE: CPT | Mod: PN | Performed by: FAMILY MEDICINE

## 2024-04-29 PROCEDURE — 84439 ASSAY OF FREE THYROXINE: CPT | Performed by: FAMILY MEDICINE

## 2024-04-29 PROCEDURE — 99214 OFFICE O/P EST MOD 30 MIN: CPT | Mod: S$PBB,,, | Performed by: FAMILY MEDICINE

## 2024-04-29 PROCEDURE — 80053 COMPREHEN METABOLIC PANEL: CPT | Performed by: FAMILY MEDICINE

## 2024-04-29 PROCEDURE — 99215 OFFICE O/P EST HI 40 MIN: CPT | Mod: PBBFAC,PN | Performed by: FAMILY MEDICINE

## 2024-04-29 PROCEDURE — 99999 PR PBB SHADOW E&M-EST. PATIENT-LVL V: CPT | Mod: PBBFAC,,, | Performed by: FAMILY MEDICINE

## 2024-04-29 PROCEDURE — 83036 HEMOGLOBIN GLYCOSYLATED A1C: CPT | Performed by: FAMILY MEDICINE

## 2024-04-29 PROCEDURE — 84443 ASSAY THYROID STIM HORMONE: CPT | Performed by: FAMILY MEDICINE

## 2024-04-29 RX ORDER — BACLOFEN 10 MG/1
10 TABLET ORAL 3 TIMES DAILY PRN
Qty: 90 TABLET | Refills: 0 | Status: SHIPPED | OUTPATIENT
Start: 2024-04-29 | End: 2025-04-29

## 2024-04-29 RX ORDER — POLYETHYLENE GLYCOL 3350 17 G/17G
POWDER, FOR SOLUTION ORAL
Qty: 595 G | Refills: 3 | Status: SHIPPED | OUTPATIENT
Start: 2024-04-29

## 2024-04-29 RX ORDER — BUTALBITAL, ACETAMINOPHEN AND CAFFEINE 50; 325; 40 MG/1; MG/1; MG/1
1 TABLET ORAL EVERY 6 HOURS PRN
Qty: 15 TABLET | Refills: 1 | Status: SHIPPED | OUTPATIENT
Start: 2024-04-29

## 2024-04-29 NOTE — PATIENT INSTRUCTIONS
Constipation- Miralax  Treatment   Mix 1 cap full of MiraLax in 1-2 oz of juice in the a.m. (OJ, apple, cranberry, etc.) to dissolve and drink to get it down.  Then drink water throughout the day . Can be in form of water, coffee, unsweetened tea or crystal light to reach goal of minimum 64 oz a day.      Check with insurance on Physical therapy and also for psychiatry for adhd evaluations through your insurance plan

## 2024-04-29 NOTE — PROGRESS NOTES
Subjective:      Patient ID: Janelle Clemente is a 23 y.o. female.    Chief Complaint: Annual Exam      Patient is a 23 y.o. female coming in today for annual exam.   Reports she continues to have persisting left knee pain. She met with KADIE Godoy, for left knee pain 10 months ago. Has been going to Urgent Care multiple times in the last year for similar sx presentation. Pt was referred to get X-rays which resulted unremarkable. Pt was referred to PT but did not start due to specialist not reaching out to pt. She has been using knee brace intermittently for pain treatment. 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 States knee brace does not help with pain prevention. Medication list updated during visit. Pt also reports occasional constipation. Has been f/u with OBGYN; pt is currently not on birth control at this time. She is inquiring about need for ADHD assessment due to recent onset of concentration deficit and increase in anxiety. Discussed steps to get approved for assessment. No other health concern at this time,       1. Recurrent pain of left knee    2. Acute pain of left knee    3. Constipation, unspecified constipation type    4. Other migraine without status migrainosus, not intractable    5. Concentration deficit    6. Routine general medical examination at a health care facility       No questionnaires on file.    Pmh, Psh, Family Hx, Social Hx, HM updated in Epic Tabs today.   Review of Systems   Constitutional:  Negative for chills, fatigue and fever.   HENT:  Negative for ear pain and trouble swallowing.    Eyes:  Negative for pain and visual disturbance.   Respiratory:  Negative for cough and shortness of breath.    Cardiovascular:  Negative for chest pain and leg swelling.   Gastrointestinal:  Positive for constipation. Negative for abdominal pain, blood in stool, nausea and vomiting.   Endocrine: Negative for cold intolerance and heat intolerance.   Genitourinary:   "Negative for dysuria and frequency.   Musculoskeletal:  Negative for joint swelling, myalgias and neck pain.        + Left knee pain   Skin:  Negative for color change and rash.   Neurological:  Negative for dizziness and headaches.   Psychiatric/Behavioral:  Negative for behavioral problems and sleep disturbance.      Objective:     Vitals:    04/29/24 1407   BP: 110/62   BP Location: Right arm   Patient Position: Sitting   BP Method: Small (Manual)   Pulse: 81   Resp: 18   Temp: 97.8 °F (36.6 °C)   TempSrc: Tympanic   SpO2: 98%   Weight: 66.3 kg (146 lb 0.9 oz)   Height: 5' 1" (1.549 m)     Wt Readings from Last 10 Encounters:   04/29/24 66.3 kg (146 lb 0.9 oz)   12/27/23 62.7 kg (138 lb 3.7 oz)   10/09/23 64.8 kg (142 lb 13.7 oz)   07/21/23 67.6 kg (149 lb 0.5 oz)   06/18/23 65.2 kg (143 lb 10.1 oz)   05/30/23 65.3 kg (143 lb 15.4 oz)   11/18/22 64.3 kg (141 lb 12.1 oz)   09/14/22 64.6 kg (142 lb 6.7 oz)   03/17/22 63 kg (138 lb 14.2 oz)   12/02/21 60.7 kg (133 lb 13.1 oz)     Physical Exam  Vitals reviewed.   Constitutional:       Appearance: Normal appearance. She is well-developed and normal weight.   HENT:      Head: Normocephalic and atraumatic.      Right Ear: Tympanic membrane and external ear normal.      Left Ear: Tympanic membrane and external ear normal.      Nose: Nose normal.      Mouth/Throat:      Mouth: Mucous membranes are moist.      Pharynx: Oropharynx is clear.   Eyes:      Conjunctiva/sclera: Conjunctivae normal.      Pupils: Pupils are equal, round, and reactive to light.   Neck:      Thyroid: No thyromegaly.   Cardiovascular:      Rate and Rhythm: Normal rate and regular rhythm.      Heart sounds: Normal heart sounds. No murmur heard.     No friction rub. No gallop.   Pulmonary:      Effort: Pulmonary effort is normal. No respiratory distress.      Breath sounds: Normal breath sounds. No wheezing or rales.   Abdominal:      General: Bowel sounds are normal. There is no distension.      " Palpations: Abdomen is soft.      Tenderness: There is no abdominal tenderness. There is no rebound.   Musculoskeletal:         General: Normal range of motion.      Cervical back: Normal range of motion and neck supple.   Lymphadenopathy:      Cervical: No cervical adenopathy.   Skin:     General: Skin is warm and dry.      Findings: No rash.   Neurological:      Mental Status: She is alert and oriented to person, place, and time.   Psychiatric:         Attention and Perception: Attention and perception normal.         Mood and Affect: Mood and affect normal.         Speech: Speech normal.         Behavior: Behavior normal.         Thought Content: Thought content normal.         Cognition and Memory: Cognition and memory normal.         Judgment: Judgment normal.         Assessment:     1. Recurrent pain of left knee    2. Acute pain of left knee    3. Constipation, unspecified constipation type    4. Other migraine without status migrainosus, not intractable    5. Concentration deficit    6. Routine general medical examination at a health care facility        Plan:   Janelle was seen today for annual exam.    Diagnoses and all orders for this visit:    Recurrent pain of left knee  -     Ambulatory referral/consult to Physical/Occupational Therapy; Future  -     baclofen (LIORESAL) 10 MG tablet; Take 1 tablet (10 mg total) by mouth 3 (three) times daily as needed.    Acute pain of left knee    Constipation, unspecified constipation type  -     polyethylene glycol (GLYCOLAX) 17 gram/dose powder; 17 gram daily PO prn [id number] gram daily PO prn constipation    Other migraine without status migrainosus, not intractable  -     butalbital-acetaminophen-caffeine -40 mg (FIORICET, ESGIC) -40 mg per tablet; Take 1 tablet by mouth every 6 (six) hours as needed for Headaches or Pain.    Concentration deficit  -     Ambulatory referral/consult to Psychology; Future    Routine general medical examination at a  "health care facility  -     TSH; Future  -     T4, Free; Future  -     Lipid Panel; Future  -     Hemoglobin A1C; Future  -     Comprehensive Metabolic Panel; Future  -     CBC Auto Differential; Future      Left knee pain is recurrent and is not properly controlled at this time.   Referral given to PT for further left knee pain assessment.   Refilled Rx Lioresal 10 mg TID for left knee pain treatment.   Advised on OTC knee sleeve for knee support.   Refilled Rx Fioricet -40 mg/day for headache treatment.   Refilled Rx Glycolax 17 g/day for constipation treatment.   Referral given to psychology for ADHD assessment; instructed to search paper trail of assessments she had as a child with pediatrician for supporting evidence to get ADHD evaluation approved by insurance.   Lab work ordered to be completed today.   Instructed to f/u in 1 year for annual exam.     Patient Instructions   Constipation- Miralax  Treatment   Mix 1 cap full of MiraLax in 1-2 oz of juice in the a.m. (OJ, apple, cranberry, etc.) to dissolve and drink to get it down.  Then drink water throughout the day . Can be in form of water, coffee, unsweetened tea or crystal light to reach goal of minimum 64 oz a day.      Check with insurance on Physical therapy and also for psychiatry for adhd evaluations through your insurance plan         Follow up in about 1 year (around 4/29/2025) for physical with Dr CARRIZALES.      LABS:   No results found for: "HGBA1C"   No results found for: "CHOL"  No results found for: "LDLCALC"  Lab Results   Component Value Date    WBC 6.96 12/02/2021    HGB 12.5 12/02/2021    HCT 39.3 12/02/2021     12/02/2021    TSH 0.460 12/02/2021       The ASCVD Risk score (Yuliya NOBLE, et al., 2019) failed to calculate for the following reasons:    The 2019 ASCVD risk score is only valid for ages 40 to 79  X-ray Knee Ortho Left  Narrative: EXAMINATION:  XR KNEE ORTHO LEFT    CLINICAL HISTORY:  Pain in left knee    TECHNIQUE:  AP " standing of both knees, Merchant views of both knees as well as a lateral view of the left knee were performed.    COMPARISON:  None    FINDINGS:  Normal knee radiographs.  No acute fracture or dislocation.  Impression: Normal radiographs.    Electronically signed by: Rk Vo  Date:    07/21/2023  Time:    12:30      Visit today included increased complexity associated with the care of the episodic problem : left knee pain, concentration deficit   and managing the longitudinal care of the patient due to the serious and/or complex managed problem(s) noted in the diagnosis section.     Scribe Attestation:   I, Isidro Kurtz, am scribing for, and in the presence of, Dr. Dasia Mijares MD. I performed the above scribed service and the documentation accurately describes the services I performed. I attest to the accuracy of the note.    I, Dr. Dasia Mijares MD, reviewed documentation as scribed above. I personally performed the services described in this documentation.  I agree that the record reflects my personal performance and is accurate and complete. Dasia Mijares MD.    04/29/2024

## 2024-04-30 ENCOUNTER — PATIENT MESSAGE (OUTPATIENT)
Dept: PRIMARY CARE CLINIC | Facility: CLINIC | Age: 24
End: 2024-04-30

## 2024-05-08 ENCOUNTER — PATIENT MESSAGE (OUTPATIENT)
Dept: REHABILITATION | Facility: HOSPITAL | Age: 24
End: 2024-05-08

## 2024-07-09 ENCOUNTER — TELEPHONE (OUTPATIENT)
Dept: OBSTETRICS AND GYNECOLOGY | Facility: CLINIC | Age: 24
End: 2024-07-09
Payer: COMMERCIAL

## 2024-07-09 NOTE — TELEPHONE ENCOUNTER
Spoke with pt.. had opening tomorrow at 2:00 for pregnancy confirmation with Linda. Pt said she would take that appointment time. Instructed pt to cancel in her mychart if something comes up.

## 2024-07-10 ENCOUNTER — OFFICE VISIT (OUTPATIENT)
Dept: OBSTETRICS AND GYNECOLOGY | Facility: CLINIC | Age: 24
End: 2024-07-10
Payer: MEDICAID

## 2024-07-10 ENCOUNTER — LAB VISIT (OUTPATIENT)
Dept: LAB | Facility: HOSPITAL | Age: 24
End: 2024-07-10
Attending: ADVANCED PRACTICE MIDWIFE
Payer: MEDICAID

## 2024-07-10 VITALS
DIASTOLIC BLOOD PRESSURE: 60 MMHG | BODY MASS INDEX: 30.01 KG/M2 | HEIGHT: 61 IN | WEIGHT: 158.94 LBS | SYSTOLIC BLOOD PRESSURE: 100 MMHG

## 2024-07-10 DIAGNOSIS — Z32.01 PREGNANCY EXAMINATION OR TEST, POSITIVE RESULT: ICD-10-CM

## 2024-07-10 DIAGNOSIS — Z32.00 POSSIBLE PREGNANCY: Primary | ICD-10-CM

## 2024-07-10 DIAGNOSIS — Z34.00 SUPERVISION OF NORMAL FIRST PREGNANCY, ANTEPARTUM: ICD-10-CM

## 2024-07-10 LAB
ABO + RH BLD: NORMAL
ALBUMIN SERPL BCP-MCNC: 4 G/DL (ref 3.5–5.2)
ALP SERPL-CCNC: 55 U/L (ref 55–135)
ALT SERPL W/O P-5'-P-CCNC: 17 U/L (ref 10–44)
ANION GAP SERPL CALC-SCNC: 10 MMOL/L (ref 8–16)
AST SERPL-CCNC: 20 U/L (ref 10–40)
B-HCG UR QL: POSITIVE
BILIRUB SERPL-MCNC: 0.5 MG/DL (ref 0.1–1)
BLD GP AB SCN CELLS X3 SERPL QL: NORMAL
BUN SERPL-MCNC: 10 MG/DL (ref 6–20)
CALCIUM SERPL-MCNC: 9.7 MG/DL (ref 8.7–10.5)
CHLORIDE SERPL-SCNC: 103 MMOL/L (ref 95–110)
CO2 SERPL-SCNC: 22 MMOL/L (ref 23–29)
CREAT SERPL-MCNC: 0.7 MG/DL (ref 0.5–1.4)
CTP QC/QA: YES
EST. GFR  (NO RACE VARIABLE): >60 ML/MIN/1.73 M^2
ESTIMATED AVG GLUCOSE: 91 MG/DL (ref 68–131)
GLUCOSE SERPL-MCNC: 79 MG/DL (ref 70–110)
HBA1C MFR BLD: 4.8 % (ref 4–5.6)
HIV 1+2 AB+HIV1 P24 AG SERPL QL IA: NORMAL
POTASSIUM SERPL-SCNC: 4.3 MMOL/L (ref 3.5–5.1)
PROT SERPL-MCNC: 7.2 G/DL (ref 6–8.4)
SODIUM SERPL-SCNC: 135 MMOL/L (ref 136–145)
SPECIMEN OUTDATE: NORMAL

## 2024-07-10 PROCEDURE — 87086 URINE CULTURE/COLONY COUNT: CPT | Performed by: ADVANCED PRACTICE MIDWIFE

## 2024-07-10 PROCEDURE — 83036 HEMOGLOBIN GLYCOSYLATED A1C: CPT | Performed by: ADVANCED PRACTICE MIDWIFE

## 2024-07-10 PROCEDURE — 80074 ACUTE HEPATITIS PANEL: CPT | Performed by: ADVANCED PRACTICE MIDWIFE

## 2024-07-10 PROCEDURE — 86762 RUBELLA ANTIBODY: CPT | Performed by: ADVANCED PRACTICE MIDWIFE

## 2024-07-10 PROCEDURE — 85025 COMPLETE CBC W/AUTO DIFF WBC: CPT | Performed by: ADVANCED PRACTICE MIDWIFE

## 2024-07-10 PROCEDURE — 81025 URINE PREGNANCY TEST: CPT | Mod: PBBFAC | Performed by: ADVANCED PRACTICE MIDWIFE

## 2024-07-10 PROCEDURE — 86900 BLOOD TYPING SEROLOGIC ABO: CPT | Performed by: ADVANCED PRACTICE MIDWIFE

## 2024-07-10 PROCEDURE — 84702 CHORIONIC GONADOTROPIN TEST: CPT | Performed by: ADVANCED PRACTICE MIDWIFE

## 2024-07-10 PROCEDURE — 99999PBSHW POCT URINE PREGNANCY: Mod: PBBFAC,,,

## 2024-07-10 PROCEDURE — 87389 HIV-1 AG W/HIV-1&-2 AB AG IA: CPT | Performed by: ADVANCED PRACTICE MIDWIFE

## 2024-07-10 PROCEDURE — 87491 CHLMYD TRACH DNA AMP PROBE: CPT | Performed by: ADVANCED PRACTICE MIDWIFE

## 2024-07-10 PROCEDURE — 86593 SYPHILIS TEST NON-TREP QUANT: CPT | Performed by: ADVANCED PRACTICE MIDWIFE

## 2024-07-10 PROCEDURE — 80053 COMPREHEN METABOLIC PANEL: CPT | Performed by: ADVANCED PRACTICE MIDWIFE

## 2024-07-10 PROCEDURE — 87591 N.GONORRHOEAE DNA AMP PROB: CPT | Performed by: ADVANCED PRACTICE MIDWIFE

## 2024-07-10 PROCEDURE — 99999 PR PBB SHADOW E&M-EST. PATIENT-LVL III: CPT | Mod: PBBFAC,,, | Performed by: ADVANCED PRACTICE MIDWIFE

## 2024-07-10 PROCEDURE — 99213 OFFICE O/P EST LOW 20 MIN: CPT | Mod: PBBFAC,TH | Performed by: ADVANCED PRACTICE MIDWIFE

## 2024-07-10 RX ORDER — ONDANSETRON 8 MG/1
8 TABLET, ORALLY DISINTEGRATING ORAL EVERY 6 HOURS PRN
Qty: 20 TABLET | Refills: 2 | Status: SHIPPED | OUTPATIENT
Start: 2024-07-10 | End: 2024-08-09

## 2024-07-10 NOTE — PROGRESS NOTES
"CC: Absence of menses risk assessment     Janelle Clemente is a 23 y.o. female  presents with complaint of absence of menstruation.  She reports nausea/vomiting. denies abdominal pain, denies  bleeding.  UPT is positive. Pregnancy was not planned and is desired.  Partner is supportive of pregnancy.  Lives at home with partner, jeanette, who is a . 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  Denies domestic abuse.  Denies chemical/pesticide/radiation exposure.    OB history:     Menstrual History  LMP 2024, EDC 2025, therefore 11w1d    History reviewed. No pertinent past medical history.  History reviewed. No pertinent surgical history.  Social History     Socioeconomic History    Marital status: Single   Occupational History     Comment: student, [employer] , at [employer]    Tobacco Use    Smoking status: Never    Smokeless tobacco: Never   Substance and Sexual Activity    Alcohol use: Never    Drug use: Never    Sexual activity: Yes     Partners: Male     Birth control/protection: Implant     Family History   Problem Relation Name Age of Onset    Breast cancer Neg Hx      Colon cancer Neg Hx      Ovarian cancer Neg Hx      Thrombosis Neg Hx       OB History    Para Term  AB Living   0 0 0 0 0 0   SAB IAB Ectopic Multiple Live Births   0 0 0 0 0       /60 (BP Location: Right arm, Patient Position: Sitting)   Ht 5' 1" (1.549 m)   Wt 72.1 kg (158 lb 15.2 oz)   LMP 2024 (Exact Date)   BMI 30.03 kg/m²         ROS:  GENERAL: Denies weight gain or weight loss. Feeling well overall.   CHEST: Denies chest pain or shortness of breath.   CARDIOVASCULAR: Denies palpitations or left sided chest pain.   URINARY: No frequency, dysuria, hematuria, or burning on urination.  REPRODUCTIVE: See HPI.   BREASTS: The patient performs breast self-examination and denies pain, lumps, or nipple discharge.   PSYCHIATRIC: Denies " depression, anxiety or mood swings.    PE:   APPEARANCE: Well nourished, well developed, in no acute distress.  AFFECT: WNL, alert and oriented x 3.  SKIN: No acne or hirsutism.  NECK: Neck symmetric without masses or thyromegaly.  CHEST: Good respiratory effort. Lungs CTA bilaterally.  CV: Regular rate and rhythm  ABDOMEN: Soft. No tenderness or masses. No hepatosplenomegaly. No hernias.  BREASTS: deferred, CBE in last three years  PELVIC: Normal external female genitalia without lesions. Normal hair distribution. Adequate perineal body, normal urethral meatus. Vagina moist and well rugated without lesions or discharge. Cervix pink, without lesions, discharge or tenderness. No significant cystocele or rectocele. Bimanual exam shows uterus is 6 weeks, regular, mobile and nontender. Adnexa without tenderness, fullness of left adnexa noted  EXTREMITIES: No edema.    ASSESSMENT and PLAN:    ICD-10-CM ICD-9-CM    1. Possible pregnancy  Z32.00 V72.40 POCT Urine Pregnancy      2. Pregnancy examination or test, positive result  Z32.01 V72.42 HIV 1/2 Ag/Ab (4th Gen)      Treponema Pallidium Antibodies IgG, IgM      Type & Screen      Rubella antibody, IgG      Urine culture      CBC auto differential      C. trachomatis/N. gonorrhoeae by AMP DNA [facility]; Cervix      HCG, Quantitative      Hemoglobin A1C      Hepatitis Panel, Acute      COMPREHENSIVE METABOLIC PANEL      US OB/GYN Procedure ([facility])-Future      ondansetron (ZOFRAN-ODT) 8 MG TbDL          -      Pap smear UTD 12/27/2023  -      Patient's medications and medical history reviewed with patient and implications in pregnancy.   -      Pregnancy course discussed.  -      Patient was counseled on exercise in pregnancy and proper weight gain based on the [place] [employer]'s recommendations based on her pre-pregnancy weight.   -      BMI 30    Discussed IOM recommended weight gain of:     Underweight  Less than 18.5 28-40      Normal  Weight 18.5-24.9  25-35      Overweight  25-29.9  15-25      Obese    30 and greater  11-20  -      Oriented to practice including CNM collaboration.   -      Follow-up routine OB labs and dating u/s in 2 weeks.    Imelda Han RN BSN  Student midwife

## 2024-07-11 PROBLEM — Z34.00 SUPERVISION OF NORMAL FIRST PREGNANCY, ANTEPARTUM: Status: ACTIVE | Noted: 2024-07-11

## 2024-07-11 LAB
BASOPHILS # BLD AUTO: 0.04 K/UL (ref 0–0.2)
BASOPHILS NFR BLD: 0.5 % (ref 0–1.9)
DIFFERENTIAL METHOD BLD: ABNORMAL
EOSINOPHIL # BLD AUTO: 0.1 K/UL (ref 0–0.5)
EOSINOPHIL NFR BLD: 1.8 % (ref 0–8)
ERYTHROCYTE [DISTWIDTH] IN BLOOD BY AUTOMATED COUNT: 11.6 % (ref 11.5–14.5)
HAV IGM SERPL QL IA: NORMAL
HBV CORE IGM SERPL QL IA: NORMAL
HBV SURFACE AG SERPL QL IA: NORMAL
HCG INTACT+B SERPL-ACNC: NORMAL MIU/ML
HCT VFR BLD AUTO: 37.3 % (ref 37–48.5)
HCV AB SERPL QL IA: NORMAL
HGB BLD-MCNC: 12.3 G/DL (ref 12–16)
IMM GRANULOCYTES # BLD AUTO: 0.02 K/UL (ref 0–0.04)
IMM GRANULOCYTES NFR BLD AUTO: 0.3 % (ref 0–0.5)
LYMPHOCYTES # BLD AUTO: 2.6 K/UL (ref 1–4.8)
LYMPHOCYTES NFR BLD: 32.5 % (ref 18–48)
MCH RBC QN AUTO: 30.9 PG (ref 27–31)
MCHC RBC AUTO-ENTMCNC: 33 G/DL (ref 32–36)
MCV RBC AUTO: 94 FL (ref 82–98)
MONOCYTES # BLD AUTO: 0.5 K/UL (ref 0.3–1)
MONOCYTES NFR BLD: 6.3 % (ref 4–15)
NEUTROPHILS # BLD AUTO: 4.7 K/UL (ref 1.8–7.7)
NEUTROPHILS NFR BLD: 58.6 % (ref 38–73)
NRBC BLD-RTO: 0 /100 WBC
PLATELET # BLD AUTO: 394 K/UL (ref 150–450)
PMV BLD AUTO: 10.6 FL (ref 9.2–12.9)
RBC # BLD AUTO: 3.98 M/UL (ref 4–5.4)
RUBV IGG SER-ACNC: 23.7 IU/ML
RUBV IGG SER-IMP: REACTIVE
TREPONEMA PALLIDUM IGG+IGM AB [PRESENCE] IN SERUM OR PLASMA BY IMMUNOASSAY: NONREACTIVE
WBC # BLD AUTO: 7.93 K/UL (ref 3.9–12.7)

## 2024-07-12 LAB
BACTERIA UR CULT: NORMAL
BACTERIA UR CULT: NORMAL

## 2024-07-13 LAB
C TRACH DNA SPEC QL NAA+PROBE: NOT DETECTED
N GONORRHOEA DNA SPEC QL NAA+PROBE: NOT DETECTED

## 2024-07-22 ENCOUNTER — PATIENT MESSAGE (OUTPATIENT)
Dept: OBSTETRICS AND GYNECOLOGY | Facility: CLINIC | Age: 24
End: 2024-07-22
Payer: MEDICAID

## 2024-07-23 ENCOUNTER — OFFICE VISIT (OUTPATIENT)
Dept: OBSTETRICS AND GYNECOLOGY | Facility: CLINIC | Age: 24
End: 2024-07-23
Payer: MEDICAID

## 2024-07-23 VITALS
BODY MASS INDEX: 29.3 KG/M2 | WEIGHT: 155.19 LBS | HEIGHT: 61 IN | DIASTOLIC BLOOD PRESSURE: 66 MMHG | SYSTOLIC BLOOD PRESSURE: 100 MMHG

## 2024-07-23 DIAGNOSIS — O02.1 MISSED AB: Primary | ICD-10-CM

## 2024-07-23 PROBLEM — Z34.00 SUPERVISION OF NORMAL FIRST PREGNANCY, ANTEPARTUM: Status: RESOLVED | Noted: 2024-07-11 | Resolved: 2024-07-23

## 2024-07-23 PROCEDURE — 99212 OFFICE O/P EST SF 10 MIN: CPT | Mod: PBBFAC,TH | Performed by: ADVANCED PRACTICE MIDWIFE

## 2024-07-23 PROCEDURE — 99999 PR PBB SHADOW E&M-EST. PATIENT-LVL II: CPT | Mod: PBBFAC,,, | Performed by: ADVANCED PRACTICE MIDWIFE

## 2024-07-23 NOTE — PROGRESS NOTES
Subjective     Patient ID: Janelle Clemente is a 23 y.o. female.    Chief Complaint: urgent mab    Presents today for ER F/U from [facility] yesterday for episode of vaginal bleeding, notes and labs reviewed  US showed IUP @ 6w4d with absent FHT's, HCG was 14,392, blood type A positive      Review of Systems   Genitourinary:         Intermittent spotting and cramping   All other systems reviewed and are negative.         Objective     Physical Exam  Vitals reviewed.   Constitutional:       Appearance: Normal appearance. She is normal weight.   Pulmonary:      Effort: Pulmonary effort is normal.   Genitourinary:     Comments: deferred  Musculoskeletal:         General: Normal range of motion.   Skin:     General: Skin is warm and dry.   Neurological:      General: No focal deficit present.      Mental Status: She is alert and oriented to person, place, and time. Mental status is at baseline.   Psychiatric:         Mood and Affect: Mood normal.         Behavior: Behavior normal.         Thought Content: Thought content normal.         Judgment: Judgment normal.            Assessment and Plan     1. Missed ab  -     HCG, Quantitative; Standing  -     US OB/GYN Procedure ([facility])-Future; Future      Serial HCG's  Repeat ultrasound early next week  Patient advised strict pelvic rest, nothing in the vagina.  Also advised to ER for saturating >1 pad per hour or development of fever. Verbalized understanding of precautions           No follow-ups on file.

## 2024-07-24 ENCOUNTER — TELEPHONE (OUTPATIENT)
Dept: OBSTETRICS AND GYNECOLOGY | Facility: CLINIC | Age: 24
End: 2024-07-24
Payer: MEDICAID

## 2024-07-24 ENCOUNTER — LAB VISIT (OUTPATIENT)
Dept: LAB | Facility: HOSPITAL | Age: 24
End: 2024-07-24
Attending: ADVANCED PRACTICE MIDWIFE
Payer: MEDICAID

## 2024-07-24 DIAGNOSIS — O02.1 MISSED AB: Primary | ICD-10-CM

## 2024-07-24 DIAGNOSIS — O02.1 MISSED AB: ICD-10-CM

## 2024-07-24 LAB — HCG INTACT+B SERPL-ACNC: 8885 MIU/ML

## 2024-07-24 PROCEDURE — 36415 COLL VENOUS BLD VENIPUNCTURE: CPT | Performed by: ADVANCED PRACTICE MIDWIFE

## 2024-07-24 PROCEDURE — 84702 CHORIONIC GONADOTROPIN TEST: CPT | Performed by: ADVANCED PRACTICE MIDWIFE

## 2024-07-24 NOTE — TELEPHONE ENCOUNTER
----- Message from Linda García CNM sent at 7/24/2024  4:04 PM CDT -----  Patient would now like to just have a D&C scheduled.

## 2024-07-25 ENCOUNTER — TELEPHONE (OUTPATIENT)
Dept: OBSTETRICS AND GYNECOLOGY | Facility: CLINIC | Age: 24
End: 2024-07-25
Payer: MEDICAID

## 2024-08-29 ENCOUNTER — TELEPHONE (OUTPATIENT)
Dept: PRIMARY CARE CLINIC | Facility: CLINIC | Age: 24
End: 2024-08-29
Payer: MEDICAID

## 2024-08-29 DIAGNOSIS — R21 RASH: Primary | ICD-10-CM

## 2024-10-24 DIAGNOSIS — R79.89 ABNORMAL LEVEL OF FEMALE SEX HORMONES: Primary | ICD-10-CM

## 2024-10-30 ENCOUNTER — CLINICAL SUPPORT (OUTPATIENT)
Dept: OBSTETRICS AND GYNECOLOGY | Facility: CLINIC | Age: 24
End: 2024-10-30
Payer: MEDICAID

## 2024-10-30 ENCOUNTER — TELEPHONE (OUTPATIENT)
Dept: OBSTETRICS AND GYNECOLOGY | Facility: CLINIC | Age: 24
End: 2024-10-30

## 2024-10-30 DIAGNOSIS — Z32.00 POSSIBLE PREGNANCY: Primary | ICD-10-CM

## 2024-10-30 PROCEDURE — 99211 OFF/OP EST MAY X REQ PHY/QHP: CPT | Mod: PBBFAC

## 2024-10-30 PROCEDURE — 99999 PR PBB SHADOW E&M-EST. PATIENT-LVL I: CPT | Mod: PBBFAC,,,

## 2024-10-31 ENCOUNTER — LAB VISIT (OUTPATIENT)
Dept: LAB | Facility: HOSPITAL | Age: 24
End: 2024-10-31
Attending: FAMILY MEDICINE
Payer: MEDICAID

## 2024-10-31 DIAGNOSIS — R79.89 ABNORMAL LEVEL OF FEMALE SEX HORMONES: ICD-10-CM

## 2024-10-31 LAB — PROGEST SERPL-MCNC: 13.8 NG/ML

## 2024-10-31 PROCEDURE — 84144 ASSAY OF PROGESTERONE: CPT | Performed by: FAMILY MEDICINE

## 2024-10-31 PROCEDURE — 36415 COLL VENOUS BLD VENIPUNCTURE: CPT | Performed by: FAMILY MEDICINE

## 2024-11-07 ENCOUNTER — LAB VISIT (OUTPATIENT)
Dept: LAB | Facility: HOSPITAL | Age: 24
End: 2024-11-07
Attending: MIDWIFE
Payer: MEDICAID

## 2024-11-07 ENCOUNTER — PROCEDURE VISIT (OUTPATIENT)
Dept: OBSTETRICS AND GYNECOLOGY | Facility: CLINIC | Age: 24
End: 2024-11-07
Payer: MEDICAID

## 2024-11-07 ENCOUNTER — OFFICE VISIT (OUTPATIENT)
Dept: OBSTETRICS AND GYNECOLOGY | Facility: CLINIC | Age: 24
End: 2024-11-07
Payer: MEDICAID

## 2024-11-07 VITALS
HEIGHT: 61 IN | DIASTOLIC BLOOD PRESSURE: 82 MMHG | BODY MASS INDEX: 30.3 KG/M2 | WEIGHT: 160.5 LBS | SYSTOLIC BLOOD PRESSURE: 104 MMHG

## 2024-11-07 DIAGNOSIS — R51.9 FREQUENT HEADACHES: ICD-10-CM

## 2024-11-07 DIAGNOSIS — Z34.90 EARLY STAGE OF PREGNANCY: Primary | ICD-10-CM

## 2024-11-07 DIAGNOSIS — Z34.90 EARLY STAGE OF PREGNANCY: ICD-10-CM

## 2024-11-07 DIAGNOSIS — O21.9 NAUSEA/VOMITING IN PREGNANCY: ICD-10-CM

## 2024-11-07 DIAGNOSIS — Z32.00 POSSIBLE PREGNANCY: ICD-10-CM

## 2024-11-07 DIAGNOSIS — N76.0 RECURRENT VAGINITIS: ICD-10-CM

## 2024-11-07 PROBLEM — K59.00 CONSTIPATION: Status: RESOLVED | Noted: 2024-04-29 | Resolved: 2024-11-07

## 2024-11-07 PROBLEM — O02.1 MISSED AB: Status: RESOLVED | Noted: 2024-07-23 | Resolved: 2024-11-07

## 2024-11-07 PROBLEM — R41.840 CONCENTRATION DEFICIT: Status: RESOLVED | Noted: 2024-04-29 | Resolved: 2024-11-07

## 2024-11-07 LAB
ABO + RH BLD: NORMAL
BASOPHILS # BLD AUTO: 0.03 K/UL (ref 0–0.2)
BASOPHILS NFR BLD: 0.4 % (ref 0–1.9)
BLD GP AB SCN CELLS X3 SERPL QL: NORMAL
DIFFERENTIAL METHOD BLD: NORMAL
EOSINOPHIL # BLD AUTO: 0.1 K/UL (ref 0–0.5)
EOSINOPHIL NFR BLD: 1.4 % (ref 0–8)
ERYTHROCYTE [DISTWIDTH] IN BLOOD BY AUTOMATED COUNT: 12.1 % (ref 11.5–14.5)
HAV IGM SERPL QL IA: NORMAL
HBV CORE IGM SERPL QL IA: NORMAL
HBV SURFACE AG SERPL QL IA: NORMAL
HCT VFR BLD AUTO: 38.1 % (ref 37–48.5)
HCV AB SERPL QL IA: NORMAL
HGB BLD-MCNC: 12.2 G/DL (ref 12–16)
HIV 1+2 AB+HIV1 P24 AG SERPL QL IA: NORMAL
IMM GRANULOCYTES # BLD AUTO: 0.01 K/UL (ref 0–0.04)
IMM GRANULOCYTES NFR BLD AUTO: 0.1 % (ref 0–0.5)
LYMPHOCYTES # BLD AUTO: 2.2 K/UL (ref 1–4.8)
LYMPHOCYTES NFR BLD: 32.1 % (ref 18–48)
MCH RBC QN AUTO: 29.5 PG (ref 27–31)
MCHC RBC AUTO-ENTMCNC: 32 G/DL (ref 32–36)
MCV RBC AUTO: 92 FL (ref 82–98)
MONOCYTES # BLD AUTO: 0.5 K/UL (ref 0.3–1)
MONOCYTES NFR BLD: 7.1 % (ref 4–15)
NEUTROPHILS # BLD AUTO: 4.1 K/UL (ref 1.8–7.7)
NEUTROPHILS NFR BLD: 58.9 % (ref 38–73)
NRBC BLD-RTO: 0 /100 WBC
PLATELET # BLD AUTO: 410 K/UL (ref 150–450)
PMV BLD AUTO: 10.7 FL (ref 9.2–12.9)
RBC # BLD AUTO: 4.13 M/UL (ref 4–5.4)
SPECIMEN OUTDATE: NORMAL
TREPONEMA PALLIDUM IGG+IGM AB [PRESENCE] IN SERUM OR PLASMA BY IMMUNOASSAY: NONREACTIVE
WBC # BLD AUTO: 6.95 K/UL (ref 3.9–12.7)

## 2024-11-07 PROCEDURE — 80074 ACUTE HEPATITIS PANEL: CPT | Performed by: MIDWIFE

## 2024-11-07 PROCEDURE — 85025 COMPLETE CBC W/AUTO DIFF WBC: CPT | Performed by: MIDWIFE

## 2024-11-07 PROCEDURE — 83021 HEMOGLOBIN CHROMOTOGRAPHY: CPT | Performed by: MIDWIFE

## 2024-11-07 PROCEDURE — 87389 HIV-1 AG W/HIV-1&-2 AB AG IA: CPT | Performed by: MIDWIFE

## 2024-11-07 PROCEDURE — 76801 OB US < 14 WKS SINGLE FETUS: CPT | Mod: PBBFAC | Performed by: OBSTETRICS & GYNECOLOGY

## 2024-11-07 PROCEDURE — 99999 PR PBB SHADOW E&M-EST. PATIENT-LVL III: CPT | Mod: PBBFAC,,, | Performed by: MIDWIFE

## 2024-11-07 PROCEDURE — 87591 N.GONORRHOEAE DNA AMP PROB: CPT | Performed by: MIDWIFE

## 2024-11-07 PROCEDURE — 87086 URINE CULTURE/COLONY COUNT: CPT | Performed by: MIDWIFE

## 2024-11-07 PROCEDURE — 86762 RUBELLA ANTIBODY: CPT | Performed by: MIDWIFE

## 2024-11-07 PROCEDURE — 86593 SYPHILIS TEST NON-TREP QUANT: CPT | Performed by: MIDWIFE

## 2024-11-07 PROCEDURE — 99213 OFFICE O/P EST LOW 20 MIN: CPT | Mod: PBBFAC,TH | Performed by: MIDWIFE

## 2024-11-07 PROCEDURE — 86900 BLOOD TYPING SEROLOGIC ABO: CPT | Performed by: MIDWIFE

## 2024-11-07 RX ORDER — TERCONAZOLE 4 MG/G
1 CREAM VAGINAL NIGHTLY
Qty: 45 G | Refills: 1 | Status: SHIPPED | OUTPATIENT
Start: 2024-11-07 | End: 2024-11-14

## 2024-11-07 RX ORDER — ONDANSETRON 4 MG/1
4 TABLET, ORALLY DISINTEGRATING ORAL EVERY 6 HOURS PRN
Qty: 25 TABLET | Refills: 1 | Status: SHIPPED | OUTPATIENT
Start: 2024-11-07

## 2024-11-07 NOTE — PROGRESS NOTES
CHIEF COMPLAINT:   Patient presents with      Possible Pregnancy        HISTORY OF PRESENT ILLNESS  Janelle Clemente 23 y.o.  presents for pregnancy risk assessment.   The patient has no complaints today.  + nausea and vomiting. No bleeding or pain.  Pregnancy was planned but is desired.  Partner is supportive of pregnancy.  Lives at home with boyfriend Merna.  Has 2 dogs in the home.  Works at [employer].  Denies domestic abuse.  Denies chemical/pesticide exposure. Stopped participating in x-rays at the hospital once found out pregnant. OB history: SABX1 with D&C.        LMP: Patient's last menstrual period was 2024 (exact date).  EDC: Estimated Date of Delivery: None noted.  EGA: 5.4      Health Maintenance   Topic Date Due    Chlamydia Screening  07/10/2025    Pap Smear  2026    TETANUS VACCINE  10/05/2029    Hepatitis C Screening  Completed    Lipid Panel  Completed    HPV Vaccines  Completed       History reviewed. No pertinent past medical history.    Past Surgical History:   Procedure Laterality Date    DILATION AND CURETTAGE OF UTERUS      2024       Family History   Problem Relation Name Age of Onset    Breast cancer Neg Hx      Colon cancer Neg Hx      Ovarian cancer Neg Hx      Thrombosis Neg Hx         Social History     Socioeconomic History    Marital status: Single   Occupational History     Comment: student, [employer] , at [employer]    Tobacco Use    Smoking status: Never    Smokeless tobacco: Never   Substance and Sexual Activity    Alcohol use: Not Currently    Drug use: Never    Sexual activity: Yes     Partners: Male     Social Drivers of Health     Financial Resource Strain: Low Risk  (2024)    Received from [facility]    Overall Financial Resource Strain (CARDIA)     Difficulty of Paying Living Expenses: Not hard at all   Food Insecurity: No Food Insecurity (2024)    Received from [facility]    Hunger Vital Sign      Worried About Running Out of Food in the Last Year: Never true     Ran Out of Food in the Last Year: Never true   Transportation Needs: No Transportation Needs (8/19/2024)    Received from [facility]    PRAPARE - Transportation     Lack of Transportation (Medical): No     Lack of Transportation (Non-Medical): No   Physical Activity: Inactive (8/19/2024)    Received from [facility]    Exercise Vital Sign     Days of Exercise per Week: 0 days     Minutes of Exercise per Session: 0 min   Stress: No Stress Concern Present (8/19/2024)    Received from [facility]    [place] [employer] - Occupational Stress Questionnaire     Feeling of Stress : Not at all   Housing Stability: Low Risk  (8/19/2024)    Received from [facility]    Housing Stability Vital Sign     Unable to Pay for Housing in the Last Year: No     Number of Times Moved in the Last Year: 0     Homeless in the Last Year: No       Current Outpatient Medications   Medication Sig Dispense Refill    prenatal 25/iron/folate 6/dha (PRENA1 ORAL) Take by mouth.       No current facility-administered medications for this visit.       Review of patient's allergies indicates:  No Known Allergies      PHYSICAL EXAM   Vitals:    11/07/24 1329   BP: 104/82        PAIN SCALE: 0/10 None    PHYSICAL EXAM    ROS:  GENERAL: No fever, chills, fatigability or weight loss.  CV: Denies chest pain  PULM: Denies shortness of breath or wheezing.  ABDOMEN: Appetite fine. No weight loss. Denies diarrhea, abdominal pain, hematemesis or blood in stool.  URINARY: No flank pain, dysuria or hematuria.  REPRODUCTIVE: No abnormal vaginal bleeding. C/o + discharge, itching, burning.        PE: Annual exam/PAP is up to date  APPEARANCE: Well nourished, well developed, in no acute distress  CHEST: breathing regular, unlabored  PELVIC:   VULVA: No lesions. Normal female genitalia. Vulva red and irritated.   URETHRAL MEATUS: Normal size and location, no  lesions, no prolapse.  URETHRA: No masses, tenderness, prolapse or scarring.  VAGINA: Large amount of thick, white clumpy discharge on exam. Wet prep + for many yeast buds. No clue cells or trich seen. Female chaperone present for exam.     Dating scan today: 11/7 IUP visualized with no fetal pole. GS measures 5w4d -suspected early gestation. Consistent with LMP. Repeat U/S in 1 week.     A/P:     -      Patient was counseled today on A.C.S. Pap guidelines and recommendations for yearly pelvic exams, mammograms and monthly self breast exams; to see her PCP for other health maintenance and pregnancy. Pap smear is up to date.   -      Patient's medications and medical history reviewed with patient and implications in pregnancy. Current medications: PNV. Rx Zofran --discussed constipation and SS.   -      Patient was counseled on proper weight gain based on the [place] [employer]'s recommendations based on her pre-pregnancy weight. Discussed foods to avoid in pregnancy (i.e. sushi, fish that are high in mercury, deli meat, and unpasteurized cheeses). Discussed prenatal vitamin options (i.e. stool softener, DHA). Discussed potential medical problems in pregnancy.  -     Discussed risk of Toxoplasmosis transmission from pets and reviewed risk reduction techniques.  -     Pt was counseled on exercise in pregnancy and weight gain recommendations.  -     Offered flu shot, declines  -     Oriented to practice including CNM collaboration.   -     Follow-up initial OB, with labs and u/s.     Early stage of pregnancy  -     HIV 1/2 Ag/Ab (4th Gen); Future; Expected date: 11/07/2024  -     Treponema Pallidium Antibodies IgG, IgM; Future; Expected date: 11/07/2024  -     Type & Screen; Future; Expected date: 11/07/2024  -     Rubella antibody, IgG; Future; Expected date: 11/07/2024  -     Urine culture  -     CBC auto differential; Future; Expected date: 11/07/2024  -     US OB/GYN Procedure ([facility]); Future  -     C.  trachomatis/N. gonorrhoeae by AMP DNA [facility]; Urine  -     Hemoglobin Electrophoresis,Hgb A2 Pito.; Future; Expected date: 11/07/2024  -     Hepatitis Panel, Acute; Future; Expected date: 11/07/2024  -     HCG, Quantitative; Future; Expected date: 11/07/2024    Repeat U/S in 1 week    Nausea/vomiting in pregnancy  -     ondansetron (ZOFRAN-ODT) 4 MG TbDL; Take 1 tablet (4 mg total) by mouth every 6 (six) hours as needed.  Dispense: 25 tablet; Refill: 1    Recurrent vaginitis  -     terconazole (TERAZOL 7) 0.4 % Crea; Place 1 applicator vaginally every evening. for 7 days  Dispense: 45 g; Refill: 1    Discussed starting a daily probiotic     Frequent headaches     No Motrin/Ibuprofen in pregnancy. Advised Tylenol PRN and a bit of caffeine.

## 2024-11-08 LAB
BACTERIA UR CULT: NORMAL
BACTERIA UR CULT: NORMAL
C TRACH DNA SPEC QL NAA+PROBE: NOT DETECTED
N GONORRHOEA DNA SPEC QL NAA+PROBE: NOT DETECTED
RUBV IGG SER-ACNC: 22.6 IU/ML
RUBV IGG SER-IMP: REACTIVE

## 2024-11-11 ENCOUNTER — LAB VISIT (OUTPATIENT)
Dept: LAB | Facility: HOSPITAL | Age: 24
End: 2024-11-11
Attending: MIDWIFE
Payer: MEDICAID

## 2024-11-11 ENCOUNTER — PATIENT MESSAGE (OUTPATIENT)
Dept: LAB | Facility: HOSPITAL | Age: 24
End: 2024-11-11

## 2024-11-11 DIAGNOSIS — Z34.90 EARLY STAGE OF PREGNANCY: ICD-10-CM

## 2024-11-11 DIAGNOSIS — Z87.59 HISTORY OF MISCARRIAGE: Primary | ICD-10-CM

## 2024-11-11 LAB
HCG INTACT+B SERPL-ACNC: NORMAL MIU/ML
HGB A2 MFR BLD HPLC: 2.2 % (ref 2.2–3.2)
HGB FRACT BLD ELPH-IMP: NORMAL
HGB FRACT BLD ELPH-IMP: NORMAL

## 2024-11-11 PROCEDURE — 84702 CHORIONIC GONADOTROPIN TEST: CPT | Performed by: MIDWIFE

## 2024-11-11 PROCEDURE — 36415 COLL VENOUS BLD VENIPUNCTURE: CPT | Performed by: MIDWIFE

## 2024-11-13 ENCOUNTER — LAB VISIT (OUTPATIENT)
Dept: LAB | Facility: HOSPITAL | Age: 24
End: 2024-11-13
Attending: MIDWIFE
Payer: MEDICAID

## 2024-11-13 DIAGNOSIS — Z87.59 HISTORY OF MISCARRIAGE: ICD-10-CM

## 2024-11-13 LAB — HCG INTACT+B SERPL-ACNC: NORMAL MIU/ML

## 2024-11-13 PROCEDURE — 84702 CHORIONIC GONADOTROPIN TEST: CPT | Performed by: MIDWIFE

## 2024-11-13 PROCEDURE — 36415 COLL VENOUS BLD VENIPUNCTURE: CPT | Performed by: MIDWIFE

## 2024-11-15 ENCOUNTER — PROCEDURE VISIT (OUTPATIENT)
Dept: OBSTETRICS AND GYNECOLOGY | Facility: CLINIC | Age: 24
End: 2024-11-15
Payer: MEDICAID

## 2024-11-15 ENCOUNTER — INITIAL PRENATAL (OUTPATIENT)
Dept: OBSTETRICS AND GYNECOLOGY | Facility: CLINIC | Age: 24
End: 2024-11-15
Payer: MEDICAID

## 2024-11-15 VITALS — WEIGHT: 162.5 LBS | BODY MASS INDEX: 30.7 KG/M2 | DIASTOLIC BLOOD PRESSURE: 72 MMHG | SYSTOLIC BLOOD PRESSURE: 112 MMHG

## 2024-11-15 DIAGNOSIS — O20.8 SUBCHORIONIC HEMORRHAGE OF PLACENTA IN FIRST TRIMESTER: ICD-10-CM

## 2024-11-15 DIAGNOSIS — Z34.90 PREGNANCY, UNSPECIFIED GESTATIONAL AGE: Primary | ICD-10-CM

## 2024-11-15 DIAGNOSIS — Z34.90 EARLY STAGE OF PREGNANCY: ICD-10-CM

## 2024-11-15 DIAGNOSIS — O21.9 NAUSEA AND VOMITING DURING PREGNANCY: ICD-10-CM

## 2024-11-15 PROCEDURE — 99999 PR PBB SHADOW E&M-EST. PATIENT-LVL II: CPT | Mod: PBBFAC,,, | Performed by: STUDENT IN AN ORGANIZED HEALTH CARE EDUCATION/TRAINING PROGRAM

## 2024-11-15 PROCEDURE — 99212 OFFICE O/P EST SF 10 MIN: CPT | Mod: PBBFAC,25,TH | Performed by: STUDENT IN AN ORGANIZED HEALTH CARE EDUCATION/TRAINING PROGRAM

## 2024-11-15 PROCEDURE — 76801 OB US < 14 WKS SINGLE FETUS: CPT | Mod: PBBFAC | Performed by: OBSTETRICS & GYNECOLOGY

## 2024-11-15 RX ORDER — PYRIDOXINE HCL (VITAMIN B6) 100 MG
100 TABLET ORAL DAILY
Qty: 90 TABLET | Refills: 3 | Status: SHIPPED | OUTPATIENT
Start: 2024-11-15 | End: 2025-11-15

## 2024-11-15 NOTE — PROGRESS NOTES
CHIEF COMPLAINT:   Patient presents with      initial OB        HISTORY OF PRESENT ILLNESS  Janelle Clemente 24 y.o.  presents for initial OB  The patient has no complaints today. She is having nausea/vomiting. No bleeding or pain.    Partner is supportive of pregnancy.  Lives at home with Boyfriend.  No pets at home 2 dogs.  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  Denies domestic abuse.  Denies chemical/pesticide/radiation exposure.    OB history:  OB History          2    Para   0    Term   0       0    AB   1    Living   0         SAB   1    IAB   0    Ectopic   0    Multiple   0    Live Births   0                   LMP: Patient's last menstrual period was 2024 (exact date).  EDC: Estimated Date of Delivery: None noted.  EGA: Unknown       Health Maintenance   Topic Date Due    Chlamydia Screening  2025    Pap Smear  2026    TETANUS VACCINE  10/05/2029    Hepatitis C Screening  Completed    Lipid Panel  Completed       History reviewed. No pertinent past medical history.    Past Surgical History:   Procedure Laterality Date    DILATION AND CURETTAGE OF UTERUS      2024       Family History   Problem Relation Name Age of Onset    Breast cancer Neg Hx      Colon cancer Neg Hx      Ovarian cancer Neg Hx      Thrombosis Neg Hx         Social History     Socioeconomic History    Marital status: Single   Occupational History     Comment: student, [employer] , at [employer]    Tobacco Use    Smoking status: Never    Smokeless tobacco: Never   Substance and Sexual Activity    Alcohol use: Not Currently    Drug use: Never    Sexual activity: Yes     Partners: Male     Social Drivers of Health     Financial Resource Strain: Low Risk  (2024)    Received from [facility]    Overall Financial Resource Strain (CARDIA)     Difficulty of Paying Living Expenses: Not hard at all   Food Insecurity: No Food Insecurity (2024)    Received from [facility]    Hunger Vital Sign     Worried  About Running Out of Food in the Last Year: Never true     Ran Out of Food in the Last Year: Never true   Transportation Needs: No Transportation Needs (8/19/2024)    Received from [facility]    PRAPARE - Transportation     Lack of Transportation (Medical): No     Lack of Transportation (Non-Medical): No   Physical Activity: Inactive (8/19/2024)    Received from [facility]    Exercise Vital Sign     Days of Exercise per Week: 0 days     Minutes of Exercise per Session: 0 min   Stress: No Stress Concern Present (8/19/2024)    Received from [facility]    [place] [employer] - Occupational Stress Questionnaire     Feeling of Stress : Not at all   Housing Stability: Low Risk  (8/19/2024)    Received from [facility]    Housing Stability Vital Sign     Unable to Pay for Housing in the Last Year: No     Number of Times Moved in the Last Year: 0     Homeless in the Last Year: No       Current Outpatient Medications   Medication Sig Dispense Refill    ondansetron (ZOFRAN-ODT) 4 MG TbDL Take 1 tablet (4 mg total) by mouth every 6 (six) hours as needed. 25 tablet 1    prenatal 25/iron/folate 6/dha (PRENA1 ORAL) Take by mouth.       No current facility-administered medications for this visit.       Review of patient's allergies indicates:  No Known Allergies      PHYSICAL EXAM   Vitals:    11/15/24 1405   BP: 112/72        PAIN SCALE: 0/10 None    PHYSICAL EXAM    ROS:  GENERAL: No fever, chills, fatigability or weight loss.  CV: Denies chest pain  PULM: Denies shortness of breath or wheezing.  ABDOMEN: Appetite fine. No weight loss. Denies diarrhea, abdominal pain, hematemesis or blood in stool.  URINARY: No flank pain, dysuria or hematuria.  REPRODUCTIVE: No abnormal vaginal bleeding.       PE:   Physical Exam:   General: comfortable, in no acute distress  Cardiovascular: well perfused   Pulmonary: easy work of breathing   Abdominal: non-distended, soft, non-tender, no rebound, no  guarding  Extremities: no edema, swelling, discoloration  Neurologic: cranial nerves II-XII grossly intact  Psychologic: alert, appropriate mood and affect       A/P:     -      Patient was counseled today on A.C.S. Pap guidelines and recommendations for yearly pelvic exams, mammograms and monthly self breast exams; to see her PCP for other health maintenance and pregnancy. Last pap was normal 12/27/2023.  -      Patient's medications and medical history reviewed with patient and implications in pregnancy.   -      Pregnancy course discussed and 'AtoZ' book given. Patient was counseled on proper weight gain based on the [place] [employer]'s recommendations based on her pre-pregnancy weight. Discussed foods to avoid in pregnancy (i.e. sushi, fish that are high in mercury, deli meat, and unpasteurized cheeses). Discussed prenatal vitamin options (i.e. stool softener, DHA). Discussed potential medical problems in pregnancy.  -     Discussed risk of Toxoplasmosis transmission from pets and reviewed risk reduction techniques.  -     Chromosomal abnormality risk discussed and available testing offered - declined .   -     Oriented to practice including CNM collaboration.       -     Follow-up routine OB  labs and u/s-- . 1. Pregnancy, unspecified gestational age  -     Cancel: CULTURE, URINE    2. Nausea and vomiting during pregnancy  -     pyridoxine, vitamin B6, (B-6) 100 MG Tab; Take 1 tablet (100 mg total) by mouth once daily.  Dispense: 90 tablet; Refill: 3  -     doxylamine succinate (UNISOM, DOXYLAMINE,) 25 mg tablet; Take 1 tablet (25 mg total) by mouth nightly as needed for Insomnia.  Dispense: 30 tablet; Refill: 5

## 2024-11-22 ENCOUNTER — PATIENT MESSAGE (OUTPATIENT)
Dept: OBSTETRICS AND GYNECOLOGY | Facility: CLINIC | Age: 24
End: 2024-11-22
Payer: MEDICAID

## 2024-12-02 ENCOUNTER — PATIENT MESSAGE (OUTPATIENT)
Dept: OBSTETRICS AND GYNECOLOGY | Facility: CLINIC | Age: 24
End: 2024-12-02
Payer: MEDICAID

## 2024-12-04 PROBLEM — O46.8X9 SUBCHORIONIC HEMORRHAGE: Status: ACTIVE | Noted: 2024-12-04

## 2024-12-09 ENCOUNTER — E-VISIT (OUTPATIENT)
Dept: FAMILY MEDICINE | Facility: CLINIC | Age: 24
End: 2024-12-09
Payer: MEDICAID

## 2024-12-09 DIAGNOSIS — R21 RASH: Primary | ICD-10-CM

## 2024-12-09 RX ORDER — TRIAMCINOLONE ACETONIDE 1 MG/G
CREAM TOPICAL
Qty: 60 G | Refills: 0 | Status: SHIPPED | OUTPATIENT
Start: 2024-12-09

## 2024-12-09 NOTE — PROGRESS NOTES
Patient ID: Janelle Clemente is a 24 y.o. female.    Chief Complaint: General Illness (Entered automatically based on patient selection in [employer].)          274}  The patient initiated a request through [employer] on 12/9/2024 for evaluation and management with a chief complaint of General Illness (Entered automatically based on patient selection in [employer].)     I evaluated the questionnaire submission on 12/09/2024 .    Total Time (in minutes): 7     [facility] Peq Evisit Supergroup-Common Problems    12/9/2024 10:12 AM CST - Filed by Patient   What do you need help with? Rash   Do you agree to participate in an E-Visit? Yes   If you have any of the following symptoms, please present to your local emergency room or call 911:  I acknowledge   Do you have any of the following pregnancy-related conditions? Pregnant   What is the main issue you would like addressed today? Rash on upper left thigh   How would you describe your skin problem? Rash   When did your symptoms first appear? 12/1/2024   Where is it located?  Leg(s);  Clothes covered areas   Does it itch? Yes   Does it hurt? Yes   Where is the pain located? Where the skin change is noted   The pain came on: Suddenly   The pain has the character of: Burning   Frequency of the pain (How often does it appear)? This is the first time   Please select the face that most closely captures your pain level: 4   Is there discharge or drainage? Yes   Describe the discharge or drainage. Clear colored   Is there bleeding? No   Describe the character Solid or firm;  Closed;  Scabs   Describe the color Red   Has it changed over time? Spread to other locations   Frequency of skin problem Fluctuates at random   Duration of the skin problem (how long does it stay when it is present) Weeks   I have had a new exposure to Insects/bugs;  No new exposures   I have had a new exposure to No new exposures   What have you used to treat the skin problem? Cortizone cream   If you have used  anything for treatment, has it helped the symptoms? Yes   Other generalized symptoms that you associate with the rash No other symptoms   Provide any additional information you feel is important. I think it started with an spider bite and gradually got worse by scratching at it.   At least one photo is required for treatment to be provided. You can upload a maximum of three photos of the affected area.     Are you able to take your vital signs? No          Active Problem List with Overview Notes    Diagnosis Date Noted    Subchorionic hemorrhage 12/04/2024    Recurrent vaginitis 11/07/2024     Advised daily probiotic  Treated for yeast 11/7/24      Early stage of pregnancy 11/07/2024 11/7 IUP visualized with no fetal pole. GS measures 5w4d -suspected early gestation. Consistent with LMP. Repeat U/S in 1 week.       Frequent headaches 04/29/2024    Recurrent pain of left knee 04/29/2024      Recent Labs Obtained:  Lab Results   Component Value Date    WBC 6.95 11/07/2024    HGB 12.2 11/07/2024    HCT 38.1 11/07/2024    MCV 92 11/07/2024     11/07/2024     (L) 07/10/2024    K 4.3 07/10/2024    GLU 79 07/10/2024    CREATININE 0.7 07/10/2024    EGFRNORACEVR >60.0 07/10/2024    HGBA1C 4.8 07/10/2024    TSH 0.835 04/29/2024      Review of patient's allergies indicates:  No Known Allergies    Encounter Diagnosis   Name Primary?    Rash Yes        No orders of the defined types were placed in this encounter.     Medications Ordered This Encounter   Medications    triamcinolone acetonide 0.1% (KENALOG) 0.1 % cream     Sig: AAA bid 10 days     Dispense:  60 g     Refill:  0        E-Visit Time Tracking:    Day 1 Time (in minutes): 7    Total Time (in minutes): 7      274}

## 2024-12-16 ENCOUNTER — PATIENT MESSAGE (OUTPATIENT)
Dept: ADMINISTRATIVE | Facility: OTHER | Age: 24
End: 2024-12-16
Payer: MEDICAID

## 2024-12-16 ENCOUNTER — ROUTINE PRENATAL (OUTPATIENT)
Dept: OBSTETRICS AND GYNECOLOGY | Facility: CLINIC | Age: 24
End: 2024-12-16
Payer: MEDICAID

## 2024-12-16 ENCOUNTER — LAB VISIT (OUTPATIENT)
Dept: LAB | Facility: HOSPITAL | Age: 24
End: 2024-12-16
Attending: MIDWIFE
Payer: MEDICAID

## 2024-12-16 VITALS
BODY MASS INDEX: 30.28 KG/M2 | SYSTOLIC BLOOD PRESSURE: 124 MMHG | DIASTOLIC BLOOD PRESSURE: 76 MMHG | WEIGHT: 160.25 LBS

## 2024-12-16 DIAGNOSIS — Z34.91 NORMAL PREGNANCY IN FIRST TRIMESTER: ICD-10-CM

## 2024-12-16 DIAGNOSIS — M54.32 BILATERAL SCIATICA: ICD-10-CM

## 2024-12-16 DIAGNOSIS — M54.31 BILATERAL SCIATICA: ICD-10-CM

## 2024-12-16 DIAGNOSIS — Z3A.10 10 WEEKS GESTATION OF PREGNANCY: ICD-10-CM

## 2024-12-16 DIAGNOSIS — Z28.21 INFLUENZA VACCINE REFUSED: ICD-10-CM

## 2024-12-16 DIAGNOSIS — Z3A.10 10 WEEKS GESTATION OF PREGNANCY: Primary | ICD-10-CM

## 2024-12-16 PROBLEM — Z34.90 EARLY STAGE OF PREGNANCY: Status: RESOLVED | Noted: 2024-11-07 | Resolved: 2024-12-16

## 2024-12-16 PROCEDURE — 99999 PR PBB SHADOW E&M-EST. PATIENT-LVL III: CPT | Mod: PBBFAC,,, | Performed by: MIDWIFE

## 2024-12-16 PROCEDURE — 36415 COLL VENOUS BLD VENIPUNCTURE: CPT | Performed by: MIDWIFE

## 2024-12-16 PROCEDURE — 99213 OFFICE O/P EST LOW 20 MIN: CPT | Mod: PBBFAC,TH | Performed by: MIDWIFE

## 2024-12-22 LAB
ABOUT THE TEST: NORMAL
APPROVED BY: NORMAL
FETAL FRACTION: NORMAL
FETAL SEX RESULT: NORMAL
GESTATIONAL AGE > 9: YES
GESTATIONAL AGE: NORMAL
LAB DIRECTOR COMMENTS: NORMAL
LIMITATIONS:: NORMAL
MONOSOMY X RESULT: NOT DETECTED
NEGATIVE PREDICTIVE VALUE: NORMAL
NOTE: NORMAL
PERFORMANCE CHARACTERISTICS: NORMAL
PERFORMANCE: NORMAL
POSITIVE PREDICTIVE VALUE: NORMAL
RESULT: NEGATIVE
SERVICE CMNT 04-IMP: NORMAL
TEST METHODOLOGY:: NORMAL
TRISOMY 13 (T13): NEGATIVE
TRISOMY 18: NEGATIVE
TRISOMY 21 (T21): NEGATIVE
XXX (TRIPLE X SYNDROME): NOT DETECTED
XXY (KLINEFELTER SYNDROME): NOT DETECTED
XYY (JACOBS SYNDROME): NOT DETECTED

## 2025-01-13 ENCOUNTER — ROUTINE PRENATAL (OUTPATIENT)
Dept: OBSTETRICS AND GYNECOLOGY | Facility: CLINIC | Age: 25
End: 2025-01-13
Payer: MEDICAID

## 2025-01-13 VITALS
SYSTOLIC BLOOD PRESSURE: 118 MMHG | WEIGHT: 162.25 LBS | DIASTOLIC BLOOD PRESSURE: 76 MMHG | BODY MASS INDEX: 30.66 KG/M2

## 2025-01-13 DIAGNOSIS — M54.31 BILATERAL SCIATICA: ICD-10-CM

## 2025-01-13 DIAGNOSIS — M54.32 BILATERAL SCIATICA: ICD-10-CM

## 2025-01-13 DIAGNOSIS — Z36.89 ENCOUNTER FOR FETAL ANATOMIC SURVEY: ICD-10-CM

## 2025-01-13 DIAGNOSIS — Z3A.14 14 WEEKS GESTATION OF PREGNANCY: Primary | ICD-10-CM

## 2025-01-13 PROBLEM — Z34.92 NORMAL PREGNANCY IN SECOND TRIMESTER: Status: ACTIVE | Noted: 2024-12-16

## 2025-01-13 PROCEDURE — 99214 OFFICE O/P EST MOD 30 MIN: CPT | Mod: TH,S$PBB,, | Performed by: MIDWIFE

## 2025-01-13 PROCEDURE — 99212 OFFICE O/P EST SF 10 MIN: CPT | Mod: PBBFAC,TH | Performed by: MIDWIFE

## 2025-01-13 PROCEDURE — 99999 PR PBB SHADOW E&M-EST. PATIENT-LVL II: CPT | Mod: PBBFAC,,, | Performed by: MIDWIFE

## 2025-01-13 NOTE — PROGRESS NOTES
14 weeks gestation of pregnancy    Overall doing OK, denies vaginal bleeding  Went to  for dehydration on Friday and received IV fluids. Stressed importance of adequate hydration in pregnancy and ways to incorporate more liquids.   Plans to breastfeed baby  Mat21 normal/BOY  Undecided on circumcision. R/b reviewed and questions answered.   Zoom prenatal visit scheduled 2/5  1lb 12.2oz TWG  2T precautions  RTC in 4 weeks with anatomy scan, sooner if needed     Encounter for fetal anatomic survey  -     US OB/GYN Procedure ([facility])-Future; Future    Bilateral sciatica     Did not schedule appt with PT yet. Encouraged to do so as still having pain.

## 2025-01-22 ENCOUNTER — PATIENT MESSAGE (OUTPATIENT)
Dept: OTHER | Facility: OTHER | Age: 25
End: 2025-01-22
Payer: MEDICAID

## 2025-01-29 ENCOUNTER — PATIENT MESSAGE (OUTPATIENT)
Dept: OTHER | Facility: OTHER | Age: 25
End: 2025-01-29
Payer: MEDICAID

## 2025-02-10 ENCOUNTER — ROUTINE PRENATAL (OUTPATIENT)
Dept: OBSTETRICS AND GYNECOLOGY | Facility: CLINIC | Age: 25
End: 2025-02-10
Payer: MEDICAID

## 2025-02-10 ENCOUNTER — E-VISIT (OUTPATIENT)
Dept: OBSTETRICS AND GYNECOLOGY | Facility: CLINIC | Age: 25
End: 2025-02-10
Payer: MEDICAID

## 2025-02-10 ENCOUNTER — PROCEDURE VISIT (OUTPATIENT)
Dept: OBSTETRICS AND GYNECOLOGY | Facility: CLINIC | Age: 25
End: 2025-02-10
Payer: MEDICAID

## 2025-02-10 ENCOUNTER — PATIENT MESSAGE (OUTPATIENT)
Dept: OBSTETRICS AND GYNECOLOGY | Facility: CLINIC | Age: 25
End: 2025-02-10

## 2025-02-10 VITALS — BODY MASS INDEX: 31.45 KG/M2 | SYSTOLIC BLOOD PRESSURE: 94 MMHG | DIASTOLIC BLOOD PRESSURE: 66 MMHG | WEIGHT: 166.44 LBS

## 2025-02-10 DIAGNOSIS — N76.0 RECURRENT VAGINITIS: ICD-10-CM

## 2025-02-10 DIAGNOSIS — Z3A.18 18 WEEKS GESTATION OF PREGNANCY: ICD-10-CM

## 2025-02-10 DIAGNOSIS — N94.89 UTERINE CRAMPING: ICD-10-CM

## 2025-02-10 DIAGNOSIS — Z34.92 NORMAL PREGNANCY IN SECOND TRIMESTER: ICD-10-CM

## 2025-02-10 DIAGNOSIS — M54.30 SCIATIC NERVE PAIN, UNSPECIFIED LATERALITY: ICD-10-CM

## 2025-02-10 DIAGNOSIS — N94.89 UTERINE CRAMPING: Primary | ICD-10-CM

## 2025-02-10 LAB
BILIRUB UR QL STRIP: NEGATIVE
CLARITY UR: ABNORMAL
COLOR UR: YELLOW
GLUCOSE UR QL STRIP: NEGATIVE
HGB UR QL STRIP: NEGATIVE
KETONES UR QL STRIP: NEGATIVE
LEUKOCYTE ESTERASE UR QL STRIP: NEGATIVE
NITRITE UR QL STRIP: NEGATIVE
PH UR STRIP: 6 [PH] (ref 5–8)
PROT UR QL STRIP: NEGATIVE
SP GR UR STRIP: 1.02 (ref 1–1.03)
URN SPEC COLLECT METH UR: ABNORMAL

## 2025-02-10 PROCEDURE — 76815 OB US LIMITED FETUS(S): CPT | Mod: 26,S$PBB,, | Performed by: OBSTETRICS & GYNECOLOGY

## 2025-02-10 PROCEDURE — 99999 PR PBB SHADOW E&M-EST. PATIENT-LVL III: CPT | Mod: PBBFAC,,, | Performed by: MIDWIFE

## 2025-02-10 PROCEDURE — 99213 OFFICE O/P EST LOW 20 MIN: CPT | Mod: PBBFAC,TH | Performed by: MIDWIFE

## 2025-02-10 PROCEDURE — 99214 OFFICE O/P EST MOD 30 MIN: CPT | Mod: TH,S$PBB,, | Performed by: MIDWIFE

## 2025-02-10 PROCEDURE — 81003 URINALYSIS AUTO W/O SCOPE: CPT | Performed by: MIDWIFE

## 2025-02-10 PROCEDURE — 76817 TRANSVAGINAL US OBSTETRIC: CPT | Mod: PBBFAC | Performed by: OBSTETRICS & GYNECOLOGY

## 2025-02-10 PROCEDURE — 87086 URINE CULTURE/COLONY COUNT: CPT | Performed by: MIDWIFE

## 2025-02-10 NOTE — PROGRESS NOTES
Uterine cramping  -     Vaginosis Screen by DNA Probe; Future; Expected date: 02/10/2025  -     Urinalysis  -     Urine Culture High Risk    Patient here with c/o lower abdominal cramping since 9PM last night. States is starting to feel better. Denies VB or LOF.  U/S today: TV cervical length normal  Speculum exam performed with chaperone present. Normal appearing leukorrhea in the vagina. AFFIRM collected. Cervix is visually C/L/T.  5lb 15.2oz TWG  PTL precautions  RTC in 2 weeks with anatomy scan, sooner if needed     18 weeks gestation of pregnancy    Normal pregnancy in second trimester    Recurrent vaginitis    Sciatic nerve pain, unspecified laterality  -     Ambulatory Referral/Consult to Physical/Occupational Therapy; Future; Expected date: 02/17/2025

## 2025-02-10 NOTE — PROGRESS NOTES
Patient ID: Janelle Clemente is a 24 y.o. female.    Chief Complaint: General Illness (Entered automatically based on patient selection in [employer].)    The patient initiated a request through [employer] on 2/10/2025 for evaluation and management with a chief complaint of General Illness (Entered automatically based on patient selection in [employer].)     I evaluated the questionnaire submission on 2/10/25.    [facility] Peq Evisit [facility]group-Obgyn    2/10/2025  7:59 AM CST - Filed by Patient   What do you need help with? Other Concern   Do you agree to participate in an E-Visit? Yes   If you have any of the following symptoms, please present to your local emergency room or call 911:  I acknowledge   Do you have any of the following pregnancy-related conditions? Pregnant   Do you have any of the following symptoms? No   What is the main issue you would like addressed today? Been having belly pain since last around 9pm   Please describe your symptoms Cramping, belly tightness, pulling sensation   Where is your problem located? Lower belly   How severe are your symptoms? Moderate   Have you had these symptoms before? No   How long have you been having these symptoms? Just today   Please list any medications or treatments you have used for your condition and indicate if it was effective or not. Tylenol   What makes this feel better? Laying down, without moving   What makes this feel worse? Constantly moving   Are these symptoms related to a condition that you currently have? No   Please describe any probable cause for these symptoms Not sure maybe its a normal pregnancy thing   Provide any additional information you feel is important. The cramping/pulling sensation comes ans goes every 5-7 minutes   Please attach any relevant images or files    Are you able to take your vital signs? No         Encounter Diagnosis   Name Primary?    Uterine cramping Yes        Orders Placed This Encounter   Procedures    US OB/GYN Procedure  ([facility])-Future     Standing Status:   Future     Standing Expiration Date:   2/10/2026     Order Specific Question:   Procedures to be performed:     Answer:   Transvaginal US     Order Specific Question:   Release to patient     Answer:   Immediate    US OB/GYN Procedure ([facility])-Future     Standing Status:   Future     Standing Expiration Date:   2/10/2026     Order Specific Question:   Procedures to be performed:     Answer:   Transvaginal US     Order Specific Question:   Release to patient     Answer:   Immediate            No follow-ups on file.      E-Visit Time Tracking:    Day 1 Time (in minutes): 10    Total Time (in minutes): 10

## 2025-02-11 LAB
BACTERIA UR CULT: NORMAL
BACTERIA UR CULT: NORMAL

## 2025-02-12 ENCOUNTER — ROUTINE PRENATAL (OUTPATIENT)
Dept: OBSTETRICS AND GYNECOLOGY | Facility: CLINIC | Age: 25
End: 2025-02-12
Payer: MEDICAID

## 2025-02-12 VITALS
BODY MASS INDEX: 31.82 KG/M2 | SYSTOLIC BLOOD PRESSURE: 100 MMHG | DIASTOLIC BLOOD PRESSURE: 60 MMHG | WEIGHT: 168.44 LBS

## 2025-02-12 DIAGNOSIS — M54.31 BILATERAL SCIATICA: ICD-10-CM

## 2025-02-12 DIAGNOSIS — N94.89 UTERINE CRAMPING: Primary | ICD-10-CM

## 2025-02-12 DIAGNOSIS — Z34.92 NORMAL PREGNANCY IN SECOND TRIMESTER: ICD-10-CM

## 2025-02-12 DIAGNOSIS — M54.32 BILATERAL SCIATICA: ICD-10-CM

## 2025-02-12 DIAGNOSIS — N76.0 RECURRENT VAGINITIS: ICD-10-CM

## 2025-02-12 PROCEDURE — 99214 OFFICE O/P EST MOD 30 MIN: CPT | Mod: TH,S$PBB,, | Performed by: MIDWIFE

## 2025-02-12 PROCEDURE — 81515 NFCT DS BV&VAGINITIS DNA ALG: CPT | Performed by: MIDWIFE

## 2025-02-12 PROCEDURE — 99999 PR PBB SHADOW E&M-EST. PATIENT-LVL II: CPT | Mod: PBBFAC,,, | Performed by: MIDWIFE

## 2025-02-12 PROCEDURE — 99212 OFFICE O/P EST SF 10 MIN: CPT | Mod: PBBFAC,TH | Performed by: MIDWIFE

## 2025-02-12 RX ORDER — ACETAMINOPHEN 500 MG
500 TABLET ORAL EVERY 6 HOURS PRN
COMMUNITY

## 2025-02-12 RX ORDER — FAMOTIDINE 20 MG/1
20 TABLET, FILM COATED ORAL
COMMUNITY
Start: 2025-01-10

## 2025-02-12 NOTE — PROGRESS NOTES
Uterine cramping  -     Vaginosis Screen by DNA Probe; Future; Expected date: 02/12/2025    Patient here for repeat vaginosis swab. Sample from 2/10 placed in wrong tube. Feeling much better. Now only mild cramping. Urine culture was negative, reviewed with patient. RTC in 2 weeks with anatomy scan, sooner if needed.     Recurrent vaginitis    Normal pregnancy in second trimester    Bilateral sciatica     Starts physical therapy tomorrow

## 2025-02-13 ENCOUNTER — CLINICAL SUPPORT (OUTPATIENT)
Dept: REHABILITATION | Facility: HOSPITAL | Age: 25
End: 2025-02-13
Payer: MEDICAID

## 2025-02-13 DIAGNOSIS — M54.30 SCIATIC NERVE PAIN, UNSPECIFIED LATERALITY: ICD-10-CM

## 2025-02-13 DIAGNOSIS — R29.898 HIP WEAKNESS: ICD-10-CM

## 2025-02-13 DIAGNOSIS — M54.42 ACUTE BILATERAL LOW BACK PAIN WITH BILATERAL SCIATICA: Primary | ICD-10-CM

## 2025-02-13 DIAGNOSIS — M54.41 ACUTE BILATERAL LOW BACK PAIN WITH BILATERAL SCIATICA: Primary | ICD-10-CM

## 2025-02-13 PROCEDURE — 97530 THERAPEUTIC ACTIVITIES: CPT | Mod: PN

## 2025-02-13 PROCEDURE — 97161 PT EVAL LOW COMPLEX 20 MIN: CPT | Mod: PN

## 2025-02-13 PROCEDURE — 97112 NEUROMUSCULAR REEDUCATION: CPT | Mod: PN

## 2025-02-13 NOTE — PROGRESS NOTES
Outpatient Rehab    Physical Therapy Evaluation    Patient Name: Janelle Clemente  MRN: [id number]  YOB: 2000  Today's Date: 2/13/2025    Therapy Diagnosis:   Encounter Diagnoses   Name Primary?    Sciatic nerve pain, unspecified laterality     Acute bilateral low back pain with bilateral sciatica Yes    Hip weakness      Physician: Lana Delgado CNM    Physician Orders: Eval and Treat  Medical Diagnosis:   M54.30 (ICD-10-CM) - Sciatic nerve pain, unspecified laterality       Visit # / Visits Authorized:  1 / 1   Date of Evaluation:  2/13/2025   Insurance Authorization Period: 2/10/25 to 2/10/26  Plan of Care Certification:  2/13/2025 to 4/10/25      Time In: 4:00 PM  Time Out: 4:45 PM  Total Time: 45 minutes  Total Billable Time: 45 minutes    Intake Outcome Measure for FOTO Survey    Therapist reviewed FOTO scores for Janelle Clemente on 2/13/2025.   FOTO report - see Media section or FOTO account episode details.     Intake Score: 46%         Subjective   History of Present Illness  Janelle is a 24 y.o. female who reports to physical therapy with a chief concern of Low back, B hips (L > R). According to the patient's chart, Janelle has no past medical history on file. Janelle has a past surgical history that includes Dilation and curettage of uterus.    The patient reports a medical diagnosis of M54.30 (ICD-10-CM) - Sciatic nerve pain, unspecified laterality.            History of Present Condition/Illness: Pt reports sciatic nerve pain early in her pregnancy. Pt reports pain in her hips, pelvic area, and to the superior portion of B knees. Pt reports intermittent N/T and shooting pain. Pt reports some tingling in the saddle area but reports this is positionally dependent and eases with change in position. Pt also reports history of L knee injury.     Activities of Daily Living  Social history was obtained from Patient.               Previously independent with activities of daily  living? Yes     Currently independent with activities of daily living? No     Req's increased time for ADL performance.         Pain     Patient reports a current pain level of 6/10. Pain at best is reported as 2/10. Pain at worst is reported as 10/10.   Location: Low back, B hips (L > R)  Clinical Progression (since onset): Worsening  Pain Qualities: Aching, Discomfort  Pain-Relieving Factors: Activity modification, Change in position, Rest  Pain-Aggravating Factors: Bending, Stair climbing, Standing, Walking         Living Arrangements  Living Situation  Housing: Home independently  Living Arrangements: Spouse/significant other        Employment  Patient reports: Does the patient's condition impact their ability to work?  Employment Status: Employed full-time    - includes bending, lifting (only lifting lighter items) - pain interferes with duties      Past Medical History/Physical Systems Review:   Janelle Clemente  has no past medical history on file.    Janelle Clemente  has a past surgical history that includes Dilation and curettage of uterus.    Janelle has a current medication list which includes the following prescription(s): acetaminophen, famotidine, ondansetron, and prenatal 25/iron/folate 6/dha.    Review of patient's allergies indicates:  No Known Allergies     Objective   Posture                 Mild rounded shoulder and forward head posture.     Lumbar Range of Motion   Active (deg) Passive (deg) Pain   Flexion  (Toe toes)       Extension  (50%)   Yes   Right Lateral Flexion         Right Rotation         Left Lateral Flexion         Left Rotation                             Hip Strength - Planes of Motion   Right Strength Right Pain Left Strength Left  Pain   Flexion (L2) 4-   3 Yes   Extension           ABduction 4   4     ADduction 4-   4-     Internal Rotation           External Rotation                      Gait Analysis  Base of Support: Narrow            Gait  "Analysis Details  Uses alternating stepping pattern with one UE on railing with stair climbing. Pain reproduction throughout. Increased trunk sway with ambulation.          Treatment:  therapeutic exercises to develop strength, endurance, ROM, and flexibility for - minutes including:  -    manual therapy techniques: Joint mobilizations, Manual traction, and Soft tissue Mobilization were applied to the: - for - minutes, including:  -    neuromuscular re-education activities to improve: Balance, Coordination, Kinesthetic, Sense, Proprioception, and Posture for 15 minutes. The following activities were included:  PPT (2 x 10)   Seated isometric hip abduction (2 x 10, 10")  Seated isometric hip adduction (2 x 10, 10")    therapeutic activities to improve functional performance for 10  minutes, including:  Testing of usefulness of pelvic belt    gait training to improve functional mobility and safety for -  minutes, including:  -    Assessment & Plan   Assessment  Janelle presents with a condition of Low complexity.   Presentation of Symptoms: Changing  Will Comorbidities Impact Care: Yes  Pregnancy - 19 weeks, 1 day    Functional Limitations: Activity tolerance, Ambulating on uneven surfaces, Completing work/school activities, Decreased ambulation distance/endurance, Gait limitations, Getting off the floor, Squatting, Sitting tolerance, Standing tolerance  Impairments: Abnormal gait, Activity intolerance, Impaired physical strength    Patient Goal for Therapy (PT): Decrease pain with work duties.  Prognosis: Fair  Assessment Details: Pt presents with low back pain, B hip pain (L > R), and radiating pain into the thighs. Pt's current signs and symptoms affect her ability to efficiently and independently complete ADLs. Pt to benefit from skilled PT to address pain and other deficits noted with objective exam.     Plan  From a physical therapy perspective, the patient would benefit from: Skilled Rehab Services    Planned " therapy interventions include: Therapeutic exercise, Therapeutic activities, Neuromuscular re-education, Manual therapy, ADLs/IADLs, Gait training, and Orthotic management and training.    Planned modalities to include: Biofeedback, Cryotherapy (cold pack), Electrical stimulation - attended, Electrical stimulation - passive/unattended, Mechanical traction, Thermotherapy (hot pack), and Ultrasound.        Visit Frequency: 2 times Per Week for 8 Weeks.       This plan was discussed with Patient.   Discussion participants: Agreed Upon Plan of Care             Patient's spiritual, cultural, and educational needs considered and patient agreeable to plan of care and goals.     Education  Education was done with Patient. The patient's learning style includes Demonstration and Listening. The patient Demonstrates understanding and Verbalizes understanding.         HEP       Goals:   Active       Functional outcome       Patient stated goal: Decrease pain with work duties.        Start:  02/13/25    Expected End:  04/10/25            Patient will demonstrate independence in home program for support of progression       Start:  02/13/25    Expected End:  03/13/25               Strength       Patient will achieve bilateral hip flexion strength of 4+/5       Start:  02/13/25    Expected End:  04/10/25            Patient will achieve bilateral hip extension strength of 4+/5       Start:  02/13/25    Expected End:  04/10/25            Patient will achieve bilateral hip abduction strength of 4+/5       Start:  02/13/25    Expected End:  04/10/25                Lou Castro, PT

## 2025-02-14 ENCOUNTER — PATIENT MESSAGE (OUTPATIENT)
Dept: REHABILITATION | Facility: HOSPITAL | Age: 25
End: 2025-02-14
Payer: MEDICAID

## 2025-02-15 LAB
BACTERIAL VAGINOSIS DNA: NOT DETECTED
CANDIDA GLABRATA/KRUSEI: NOT DETECTED
CANDIDA RRNA VAG QL PROBE: NOT DETECTED
TRICHOMONAS VAGINALIS: NOT DETECTED

## 2025-02-18 ENCOUNTER — RESULTS FOLLOW-UP (OUTPATIENT)
Dept: OBSTETRICS AND GYNECOLOGY | Facility: CLINIC | Age: 25
End: 2025-02-18

## 2025-02-19 ENCOUNTER — PATIENT MESSAGE (OUTPATIENT)
Dept: OTHER | Facility: OTHER | Age: 25
End: 2025-02-19
Payer: MEDICAID

## 2025-02-20 ENCOUNTER — ROUTINE PRENATAL (OUTPATIENT)
Dept: OBSTETRICS AND GYNECOLOGY | Facility: CLINIC | Age: 25
End: 2025-02-20
Payer: MEDICAID

## 2025-02-20 ENCOUNTER — PROCEDURE VISIT (OUTPATIENT)
Dept: OBSTETRICS AND GYNECOLOGY | Facility: CLINIC | Age: 25
End: 2025-02-20
Payer: MEDICAID

## 2025-02-20 VITALS
WEIGHT: 169.56 LBS | DIASTOLIC BLOOD PRESSURE: 66 MMHG | SYSTOLIC BLOOD PRESSURE: 104 MMHG | BODY MASS INDEX: 32.03 KG/M2

## 2025-02-20 DIAGNOSIS — Z3A.20 20 WEEKS GESTATION OF PREGNANCY: ICD-10-CM

## 2025-02-20 DIAGNOSIS — M54.31 BILATERAL SCIATICA: ICD-10-CM

## 2025-02-20 DIAGNOSIS — Z34.92 NORMAL PREGNANCY IN SECOND TRIMESTER: Primary | ICD-10-CM

## 2025-02-20 DIAGNOSIS — M54.32 BILATERAL SCIATICA: ICD-10-CM

## 2025-02-20 DIAGNOSIS — N76.0 RECURRENT VAGINITIS: ICD-10-CM

## 2025-02-20 DIAGNOSIS — O02.1 MISSED AB: ICD-10-CM

## 2025-02-20 PROCEDURE — 99212 OFFICE O/P EST SF 10 MIN: CPT | Mod: PBBFAC,TH | Performed by: MIDWIFE

## 2025-02-20 PROCEDURE — 99999 PR PBB SHADOW E&M-EST. PATIENT-LVL II: CPT | Mod: PBBFAC,,, | Performed by: MIDWIFE

## 2025-02-20 PROCEDURE — 99214 OFFICE O/P EST MOD 30 MIN: CPT | Mod: TH,S$PBB,, | Performed by: MIDWIFE

## 2025-02-24 NOTE — PROGRESS NOTES
Normal pregnancy in second trimester    Doing well, + fetal movement  Denies contractions, VB, LOF  Encouraged meet and greet visits. Pedi packet given.   Plans L&D unit tour  U/S today: breech, posterior placenta, 3VC, growth 40%tile, right kidney 4.1mm, left kidney 5.2mm, normal anatomy, MFM to review scan.   9lb 0.6oz TWG  PTL precautions  RTC in 4 weeks, sooner if needed     Recurrent vaginitis    AFFIRM 2/12 negative    Bilateral sciatica    Continue physical therapy   Note from 2/13 reviewed     20 weeks gestation of pregnancy

## 2025-02-26 PROBLEM — O35.EXX0 PYELECTASIS OF FETUS ON PRENATAL ULTRASOUND: Status: ACTIVE | Noted: 2025-02-26

## 2025-02-27 ENCOUNTER — CLINICAL SUPPORT (OUTPATIENT)
Dept: REHABILITATION | Facility: HOSPITAL | Age: 25
End: 2025-02-27
Payer: MEDICAID

## 2025-02-27 DIAGNOSIS — R29.898 HIP WEAKNESS: ICD-10-CM

## 2025-02-27 DIAGNOSIS — M54.41 ACUTE BILATERAL LOW BACK PAIN WITH BILATERAL SCIATICA: Primary | ICD-10-CM

## 2025-02-27 DIAGNOSIS — M54.42 ACUTE BILATERAL LOW BACK PAIN WITH BILATERAL SCIATICA: Primary | ICD-10-CM

## 2025-02-27 PROCEDURE — 97112 NEUROMUSCULAR REEDUCATION: CPT | Mod: PN

## 2025-02-27 PROCEDURE — 97110 THERAPEUTIC EXERCISES: CPT | Mod: PN

## 2025-02-27 NOTE — PROGRESS NOTES
"  Outpatient Rehab    Physical Therapy Visit    Patient Name: Janelle Clemente  MRN: [id number]  YOB: 2000  Encounter Date: 2/27/2025    Therapy Diagnosis:   Encounter Diagnoses   Name Primary?    Acute bilateral low back pain with bilateral sciatica Yes    Hip weakness      Physician: Lana Delgado CNM    Physician Orders: Eval and Treat  Medical Diagnosis:   M54.30 (ICD-10-CM) - Sciatic nerve pain, unspecified laterality         Visit # / Visits Authorized:  1 / 12   Date of Evaluation:  2/13/2025   Insurance Authorization Period: 2/10/25 to 2/10/26  Plan of Care Certification:  2/13/2025 to 4/10/25     Time In: 7:05 AM    Time Out: 8:00 AM   Total Time: 55 minutes    Total Billable Time: 55 minutes    FOTO:  Intake Score:  %  Survey Score 1:  %  Survey Score 2:  %         Subjective   Hasn't gotten SIJ belt as of yet. Feels like she needs to get better shoes that provide more support due to being on her feet a good bit of the time..  Pain reported as 5/10.      Objective            Treatment:  therapeutic exercises to develop strength, endurance, ROM, and flexibility for 10 minutes including:  Recumbent bike (5')  Calf stretch (3 x 30")     manual therapy techniques: Joint mobilizations, Manual traction, and Soft tissue Mobilization were applied to the: - for - minutes, including:  -     neuromuscular re-education activities to improve: Balance, Coordination, Kinesthetic, Sense, Proprioception, and Posture for 35 minutes. The following activities were included:  PPT (2 x 10)  Seated isometric hip abduction (2 x 10, 10")  Seated isometric hip adduction (2 x 10, 10")  Cat\cow (2 x 10)  QP TA (2 x 10, 10")  QP weight shifts (2 x 1')  QP Alt UE (2 x 10)     therapeutic activities to improve functional performance for - minutes, including:  Testing of usefulness of pelvic belt     gait training to improve functional mobility and safety for - minutes, including:  -    Pt participated in moist heat " for 10' following treatment.     Assessment & Plan   Assessment: Pt demonstrates independence c HEP provided during previous visit without cuing req'd to maintain desired mechanics. Pt participated in QP exercises focused on core engagement today. Pt tolerated these well though reports some discomfort through B wrists. Pt requires tactile cuing to improve mechanics in this position.       Patient will continue to benefit from skilled outpatient physical therapy to address the deficits listed in the problem list box on initial evaluation, provide pt/family education and to maximize pt's level of independence in the home and community environment.     Patient's spiritual, cultural, and educational needs considered and patient agreeable to plan of care and goals.           Plan: Planned therapy interventions include: Therapeutic exercise, Therapeutic activities, Neuromuscular re-education, Manual therapy, ADLs/IADLs, Gait training, and Orthotic management and training.    Planned modalities to include: Biofeedback, Cryotherapy (cold pack), Electrical stimulation - attended, Electrical stimulation - passive/unattended, Mechanical traction, Thermotherapy (hot pack), and Ultrasound.         Visit Frequency: 2 times Per Week for 8 Weeks.    Goals:   Active       Functional outcome       Patient stated goal: Decrease pain with work duties.  (Progressing)       Start:  02/13/25    Expected End:  04/10/25            Patient will demonstrate independence in home program for support of progression (Progressing)       Start:  02/13/25    Expected End:  03/13/25               Strength       Patient will achieve bilateral hip flexion strength of 4+/5 (Progressing)       Start:  02/13/25    Expected End:  04/10/25            Patient will achieve bilateral hip extension strength of 4+/5 (Progressing)       Start:  02/13/25    Expected End:  04/10/25            Patient will achieve bilateral hip abduction strength of 4+/5  (Progressing)       Start:  02/13/25    Expected End:  04/10/25                Lou Castro PT

## 2025-03-03 DIAGNOSIS — O21.9 NAUSEA/VOMITING IN PREGNANCY: ICD-10-CM

## 2025-03-03 RX ORDER — ASPIRIN 81 MG/1
81 TABLET ORAL DAILY
COMMUNITY
Start: 2025-03-03 | End: 2025-12-08

## 2025-03-03 RX ORDER — ONDANSETRON 4 MG/1
4 TABLET, ORALLY DISINTEGRATING ORAL EVERY 6 HOURS PRN
Qty: 25 TABLET | Refills: 1 | Status: SHIPPED | OUTPATIENT
Start: 2025-03-03

## 2025-03-04 ENCOUNTER — CLINICAL SUPPORT (OUTPATIENT)
Dept: REHABILITATION | Facility: HOSPITAL | Age: 25
End: 2025-03-04
Payer: MEDICAID

## 2025-03-04 DIAGNOSIS — R29.898 HIP WEAKNESS: ICD-10-CM

## 2025-03-04 DIAGNOSIS — M54.42 ACUTE BILATERAL LOW BACK PAIN WITH BILATERAL SCIATICA: Primary | ICD-10-CM

## 2025-03-04 DIAGNOSIS — M54.41 ACUTE BILATERAL LOW BACK PAIN WITH BILATERAL SCIATICA: Primary | ICD-10-CM

## 2025-03-04 PROCEDURE — 97112 NEUROMUSCULAR REEDUCATION: CPT | Mod: PN

## 2025-03-04 NOTE — PROGRESS NOTES
"  Outpatient Rehab    Physical Therapy Visit    Patient Name: Janelle Clemente  MRN: [id number]  YOB: 2000  Encounter Date: 3/4/2025    Therapy Diagnosis:   Encounter Diagnoses   Name Primary?    Acute bilateral low back pain with bilateral sciatica Yes    Hip weakness      Physician: Lana Delgado CNM    Physician Orders: Eval and Treat  Medical Diagnosis:   M54.30 (ICD-10-CM) - Sciatic nerve pain, unspecified laterality         Visit # / Visits Authorized:  1 / 12   Date of Evaluation:  2/13/2025   Insurance Authorization Period: 2/10/25 to 2/10/26  Plan of Care Certification:  2/13/2025 to 4/10/25     Time In: 3:30 PM    Time Out: 4:30 PM   Total Time: 60 minutes    Total Billable Time: 60 minutes    FOTO:  Intake Score:  %  Survey Score 1:  %  Survey Score 2:  %         Subjective   Has been doing more  work instead of teching which has helped her pain a lot. 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  Pain reported as 2/10.      Objective            Treatment:  therapeutic exercises to develop strength, endurance, ROM, and flexibility for 5 minutes including:  Recumbent bike (5')     manual therapy techniques: Joint mobilizations, Manual traction, and Soft tissue Mobilization were applied to the: - for - minutes, including:  -     neuromuscular re-education activities to improve: Balance, Coordination, Kinesthetic, Sense, Proprioception, and Posture for 55 minutes. The following activities were included:  Seated isometric hip abduction (3 x 10, 10")  Seated isometric hip adduction (3 x 10, 10")  Cat\cow (2 x 10)  QP TA (2 x 10, 10")  QP weight shifts (2 x 1')  QP Alt UE (2 x 10)  Paloff press (3 x 10, 30 lbs)  Chops (2 x 10, 30 lbs)     therapeutic activities to improve functional performance for - minutes, including:  Testing of usefulness of pelvic belt     gait training to improve functional " mobility and safety for - minutes, including:  -       Assessment & Plan   Assessment: Pt tolerated advancement in today's treatment well. Pt does report mild increase in LE muscular tension following exercise today. Pt requires minimal cuing to achieve desired mechanics with exercise performance.       Patient will continue to benefit from skilled outpatient physical therapy to address the deficits listed in the problem list box on initial evaluation, provide pt/family education and to maximize pt's level of independence in the home and community environment.     Patient's spiritual, cultural, and educational needs considered and patient agreeable to plan of care and goals.           Plan: Planned therapy interventions include: Therapeutic exercise, Therapeutic activities, Neuromuscular re-education, Manual therapy, ADLs/IADLs, Gait training, and Orthotic management and training.    Planned modalities to include: Biofeedback, Cryotherapy (cold pack), Electrical stimulation - attended, Electrical stimulation - passive/unattended, Mechanical traction, Thermotherapy (hot pack), and Ultrasound.         Visit Frequency: 2 times Per Week for 8 Weeks.    Goals:   Active       Functional outcome       Patient stated goal: Decrease pain with work duties.  (Progressing)       Start:  02/13/25    Expected End:  04/10/25            Patient will demonstrate independence in home program for support of progression (Progressing)       Start:  02/13/25    Expected End:  03/13/25               Strength       Patient will achieve bilateral hip flexion strength of 4+/5 (Progressing)       Start:  02/13/25    Expected End:  04/10/25            Patient will achieve bilateral hip extension strength of 4+/5 (Progressing)       Start:  02/13/25    Expected End:  04/10/25            Patient will achieve bilateral hip abduction strength of 4+/5 (Progressing)       Start:  02/13/25    Expected End:  04/10/25                Lou Castro  PT

## 2025-03-10 ENCOUNTER — CLINICAL SUPPORT (OUTPATIENT)
Dept: REHABILITATION | Facility: HOSPITAL | Age: 25
End: 2025-03-10
Payer: MEDICAID

## 2025-03-10 DIAGNOSIS — M54.42 ACUTE BILATERAL LOW BACK PAIN WITH BILATERAL SCIATICA: Primary | ICD-10-CM

## 2025-03-10 DIAGNOSIS — M54.41 ACUTE BILATERAL LOW BACK PAIN WITH BILATERAL SCIATICA: Primary | ICD-10-CM

## 2025-03-10 DIAGNOSIS — R29.898 HIP WEAKNESS: ICD-10-CM

## 2025-03-10 PROCEDURE — 97110 THERAPEUTIC EXERCISES: CPT | Mod: PN

## 2025-03-10 PROCEDURE — 97112 NEUROMUSCULAR REEDUCATION: CPT | Mod: PN

## 2025-03-10 NOTE — PROGRESS NOTES
"  Outpatient Rehab    Physical Therapy Visit    Patient Name: Janelle Clemente  MRN: [id number]  YOB: 2000  Encounter Date: 3/10/2025    Therapy Diagnosis:   Encounter Diagnoses   Name Primary?    Acute bilateral low back pain with bilateral sciatica Yes    Hip weakness      Physician: Lana Delgado CNM    Physician Orders: Eval and Treat  Medical Diagnosis:   M54.30 (ICD-10-CM) - Sciatic nerve pain, unspecified laterality         Visit # / Visits Authorized:  4 / 12   Date of Evaluation:  2/13/2025   Insurance Authorization Period: 2/10/25 to 2/10/26  Plan of Care Certification:  2/13/2025 to 4/10/25     Time In: 0736   Time Out: 0830  Total Time: 54   Total Billable Time: 54 minutes    FOTO:  Intake Score:  %  Survey Score 1:  %  Survey Score 2:  %         Subjective   Feeling a lot better lately. 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  Pain reported as 2/10.      Objective            Treatment  therapeutic exercises to develop strength, endurance, ROM, and flexibility for 15 minutes including:  Recumbent bike (5')  Calf stretch (3 x 30")  Seated piriformis stretch (2 x 30")      manual therapy techniques: Joint mobilizations, Manual traction, and Soft tissue Mobilization were applied to the: - for - minutes, including:  -     neuromuscular re-education activities to improve: Balance, Coordination, Kinesthetic, Sense, Proprioception, and Posture for 45 minutes. The following activities were included:  Cat\cow (15 x)  QP TA (2 x 10, 10")  QP Alt UE (2 x 10)  QP Alt LE (2 x 10)  Paloff press (3 x 10, 30 lbs)  Chops (2 x 10, 30 lbs)  Standing hip hikes (2 x 10)  Standing hip 3 way (3 x 5)     therapeutic activities to improve functional performance for - minutes, including:  Testing of usefulness of pelvic belt     gait training to improve functional mobility and safety for - minutes, including:  -       Assessment & Plan   Assessment: Pt tolerates advancement in " exercise well today. 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 Pt requires mild verbal cuing to achieve desired mechanics with exercise.       Patient will continue to benefit from skilled outpatient physical therapy to address the deficits listed in the problem list box on initial evaluation, provide pt/family education and to maximize pt's level of independence in the home and community environment.     Patient's spiritual, cultural, and educational needs considered and patient agreeable to plan of care and goals.           Plan: Planned therapy interventions include: Therapeutic exercise, Therapeutic activities, Neuromuscular re-education, Manual therapy, ADLs/IADLs, Gait training, and Orthotic management and training.    Planned modalities to include: Biofeedback, Cryotherapy (cold pack), Electrical stimulation - attended, Electrical stimulation - passive/unattended, Mechanical traction, Thermotherapy (hot pack), and Ultrasound.         Visit Frequency: 2 times Per Week for 8 Weeks.    Goals:   Active       Functional outcome       Patient stated goal: Decrease pain with work duties.  (Progressing)       Start:  02/13/25    Expected End:  04/10/25            Patient will demonstrate independence in home program for support of progression (Progressing)       Start:  02/13/25    Expected End:  03/13/25               Strength       Patient will achieve bilateral hip flexion strength of 4+/5 (Progressing)       Start:  02/13/25    Expected End:  04/10/25            Patient will achieve bilateral hip extension strength of 4+/5 (Progressing)       Start:  02/13/25    Expected End:  04/10/25            Patient will achieve bilateral hip abduction strength of 4+/5 (Progressing)       Start:  02/13/25    Expected End:  04/10/25                Lou Castro, PT

## 2025-03-19 ENCOUNTER — PATIENT MESSAGE (OUTPATIENT)
Dept: OTHER | Facility: OTHER | Age: 25
End: 2025-03-19
Payer: MEDICAID

## 2025-03-28 ENCOUNTER — CLINICAL SUPPORT (OUTPATIENT)
Dept: REHABILITATION | Facility: HOSPITAL | Age: 25
End: 2025-03-28
Payer: MEDICAID

## 2025-03-28 ENCOUNTER — ROUTINE PRENATAL (OUTPATIENT)
Dept: OBSTETRICS AND GYNECOLOGY | Facility: CLINIC | Age: 25
End: 2025-03-28
Payer: MEDICAID

## 2025-03-28 VITALS
WEIGHT: 184.06 LBS | SYSTOLIC BLOOD PRESSURE: 108 MMHG | BODY MASS INDEX: 34.78 KG/M2 | DIASTOLIC BLOOD PRESSURE: 60 MMHG

## 2025-03-28 DIAGNOSIS — O35.EXX0 PYELECTASIS OF FETUS ON PRENATAL ULTRASOUND: ICD-10-CM

## 2025-03-28 DIAGNOSIS — Z3A.25 25 WEEKS GESTATION OF PREGNANCY: Primary | ICD-10-CM

## 2025-03-28 DIAGNOSIS — R29.898 HIP WEAKNESS: ICD-10-CM

## 2025-03-28 DIAGNOSIS — M54.41 ACUTE BILATERAL LOW BACK PAIN WITH BILATERAL SCIATICA: Primary | ICD-10-CM

## 2025-03-28 DIAGNOSIS — M54.31 BILATERAL SCIATICA: ICD-10-CM

## 2025-03-28 DIAGNOSIS — M54.42 ACUTE BILATERAL LOW BACK PAIN WITH BILATERAL SCIATICA: Primary | ICD-10-CM

## 2025-03-28 DIAGNOSIS — M54.32 BILATERAL SCIATICA: ICD-10-CM

## 2025-03-28 PROCEDURE — 99999 PR PBB SHADOW E&M-EST. PATIENT-LVL III: CPT | Mod: PBBFAC,,, | Performed by: MIDWIFE

## 2025-03-28 PROCEDURE — 97110 THERAPEUTIC EXERCISES: CPT | Mod: PN

## 2025-03-28 PROCEDURE — 97112 NEUROMUSCULAR REEDUCATION: CPT | Mod: PN

## 2025-03-28 PROCEDURE — 99213 OFFICE O/P EST LOW 20 MIN: CPT | Mod: PBBFAC,TH | Performed by: MIDWIFE

## 2025-03-28 NOTE — PROGRESS NOTES
"  Outpatient Rehab    Physical Therapy Visit    Patient Name: Janelle Clemente  MRN: [id number]  YOB: 2000  Encounter Date: 3/28/2025    Therapy Diagnosis:   Encounter Diagnoses   Name Primary?    Acute bilateral low back pain with bilateral sciatica Yes    Hip weakness      Physician: Lana Delgado CNM    Physician Orders: Eval and Treat  Medical Diagnosis:   M54.30 (ICD-10-CM) - Sciatic nerve pain, unspecified laterality         Visit # / Visits Authorized:  5 / 12   Date of Evaluation:  2/13/2025   Insurance Authorization Period: 2/10/25 to 2/10/26  Plan of Care Certification:  2/13/2025 to 4/10/25     Time In:   9:00 AM  Time Out:  10:20 AM  Total Time:   80 minutes  Total Billable Time: 80 minutes    FOTO:  Intake Score:  %  Survey Score 1:  %  Survey Score 2:  %         Subjective   Hips bothering her some today. Overall has been doing well. Not teching as much which helps but does get increase in pain with prolonged sitting or standing..  Pain reported as 3/10.      Objective            Treatment  therapeutic exercises to develop strength, endurance, ROM, and flexibility for 10 minutes including:  Recumbent bike (5')  Calf stretch (3 x 30")     manual therapy techniques: Joint mobilizations, Manual traction, and Soft tissue Mobilization were applied to the: - for - minutes, including:  -     neuromuscular re-education activities to improve: Balance, Coordination, Kinesthetic, Sense, Proprioception, and Posture for 70 minutes. The following activities were included:  Seated isometric hip abduction (2 x 10, 10", BlueTB)  Seated isometric hip adduction (2 x 10, 10")  Paloff press (2 x 15, 20 lbs)  Chops (2 x 10, 30 lbs)  Standing hip hikes (2 x 10)  Standing hip 3 way (3 x 5)  QP TA (2 x 10, 10")  QP Alt UE (2 x 10)  QP Alt LE (2 x 10)     therapeutic activities to improve functional performance for - minutes, including:  Testing of usefulness of pelvic belt     gait training to improve " functional mobility and safety for - minutes, including:  -       Assessment & Plan   Assessment: Pt tolerates today's exericse well and denies significant increase in familiar pain with performance. Pt and PT discussed multifidus rocking when in a standing position to faciliate multifidus activation. Pt requires minimal cuing to maintain desired mechanics with exercise today.       Patient will continue to benefit from skilled outpatient physical therapy to address the deficits listed in the problem list box on initial evaluation, provide pt/family education and to maximize pt's level of independence in the home and community environment.     Patient's spiritual, cultural, and educational needs considered and patient agreeable to plan of care and goals.           Plan: Planned therapy interventions include: Therapeutic exercise, Therapeutic activities, Neuromuscular re-education, Manual therapy, ADLs/IADLs, Gait training, and Orthotic management and training.    Planned modalities to include: Biofeedback, Cryotherapy (cold pack), Electrical stimulation - attended, Electrical stimulation - passive/unattended, Mechanical traction, Thermotherapy (hot pack), and Ultrasound.         Visit Frequency: 2 times Per Week for 8 Weeks.    Goals:   Active       Functional outcome       Patient stated goal: Decrease pain with work duties.  (Progressing)       Start:  02/13/25    Expected End:  04/10/25            Patient will demonstrate independence in home program for support of progression (Met)       Start:  02/13/25    Expected End:  03/13/25    Resolved:  03/28/25            Strength       Patient will achieve bilateral hip flexion strength of 4+/5 (Progressing)       Start:  02/13/25    Expected End:  04/10/25            Patient will achieve bilateral hip extension strength of 4+/5 (Progressing)       Start:  02/13/25    Expected End:  04/10/25            Patient will achieve bilateral hip abduction strength of 4+/5  (Progressing)       Start:  02/13/25    Expected End:  04/10/25                Lou Castro PT

## 2025-03-31 ENCOUNTER — HOSPITAL ENCOUNTER (OUTPATIENT)
Facility: HOSPITAL | Age: 25
Discharge: HOME | End: 2025-03-31
Attending: STUDENT IN AN ORGANIZED HEALTH CARE EDUCATION/TRAINING PROGRAM | Admitting: STUDENT IN AN ORGANIZED HEALTH CARE EDUCATION/TRAINING PROGRAM
Payer: MEDICAID

## 2025-03-31 VITALS — DIASTOLIC BLOOD PRESSURE: 68 MMHG | OXYGEN SATURATION: 99 % | HEART RATE: 103 BPM | SYSTOLIC BLOOD PRESSURE: 106 MMHG

## 2025-03-31 DIAGNOSIS — O26.892 PELVIC PAIN AFFECTING PREGNANCY IN SECOND TRIMESTER, ANTEPARTUM: ICD-10-CM

## 2025-03-31 DIAGNOSIS — R10.2 PELVIC PAIN AFFECTING PREGNANCY IN SECOND TRIMESTER, ANTEPARTUM: ICD-10-CM

## 2025-03-31 LAB
BACTERIA #/AREA URNS AUTO: ABNORMAL /HPF
BILIRUB UR QL STRIP.AUTO: NEGATIVE
CLARITY UR: ABNORMAL
COLOR UR AUTO: YELLOW
GLUCOSE UR QL STRIP: NEGATIVE
HGB UR QL STRIP: NEGATIVE
HYALINE CASTS UR QL AUTO: 0 /LPF (ref 0–1)
KETONES UR QL STRIP: ABNORMAL
LEUKOCYTE ESTERASE UR QL STRIP: ABNORMAL
MICROSCOPIC COMMENT: ABNORMAL
NITRITE UR QL STRIP: NEGATIVE
NON-SQ EPI CELLS #/AREA URNS AUTO: 0 /HPF
PH UR STRIP: 7 [PH]
PROT UR QL STRIP: ABNORMAL
RBC #/AREA URNS AUTO: 2 /HPF (ref 0–4)
SP GR UR STRIP: 1.02
SQUAMOUS #/AREA URNS AUTO: 50 /HPF
UROBILINOGEN UR STRIP-ACNC: NEGATIVE EU/DL
WBC #/AREA URNS AUTO: 3 /HPF (ref 0–5)
WBC CLUMPS UR QL AUTO: ABNORMAL

## 2025-03-31 PROCEDURE — 25000003 PHARM REV CODE 250

## 2025-03-31 PROCEDURE — 99211 OFF/OP EST MAY X REQ PHY/QHP: CPT

## 2025-03-31 PROCEDURE — 81003 URINALYSIS AUTO W/O SCOPE: CPT

## 2025-03-31 PROCEDURE — 99214 OFFICE O/P EST MOD 30 MIN: CPT | Mod: TH,,,

## 2025-03-31 RX ORDER — ACETAMINOPHEN 500 MG
1000 TABLET ORAL ONCE
Status: COMPLETED | OUTPATIENT
Start: 2025-03-31 | End: 2025-03-31

## 2025-03-31 RX ORDER — ACETAMINOPHEN 500 MG
500 TABLET ORAL EVERY 6 HOURS PRN
Status: DISCONTINUED | OUTPATIENT
Start: 2025-03-31 | End: 2025-03-31 | Stop reason: HOSPADM

## 2025-03-31 RX ORDER — CYCLOBENZAPRINE HCL 10 MG
10 TABLET ORAL ONCE
Status: COMPLETED | OUTPATIENT
Start: 2025-03-31 | End: 2025-03-31

## 2025-03-31 RX ORDER — ONDANSETRON 8 MG/1
8 TABLET, ORALLY DISINTEGRATING ORAL EVERY 8 HOURS PRN
Status: DISCONTINUED | OUTPATIENT
Start: 2025-03-31 | End: 2025-03-31 | Stop reason: HOSPADM

## 2025-03-31 RX ORDER — SODIUM CHLORIDE, SODIUM LACTATE, POTASSIUM CHLORIDE, CALCIUM CHLORIDE 600; 310; 30; 20 MG/100ML; MG/100ML; MG/100ML; MG/100ML
INJECTION, SOLUTION INTRAVENOUS CONTINUOUS
Status: DISCONTINUED | OUTPATIENT
Start: 2025-03-31 | End: 2025-03-31 | Stop reason: HOSPADM

## 2025-03-31 RX ADMIN — ACETAMINOPHEN 1000 MG: 500 TABLET ORAL at 06:03

## 2025-03-31 RX ADMIN — CYCLOBENZAPRINE HYDROCHLORIDE 10 MG: 10 TABLET, FILM COATED ORAL at 07:03

## 2025-04-01 NOTE — ASSESSMENT & PLAN NOTE
EFM  Speculum exam performed. Cervix closed, negative pooling, scant amount of normal leukorrhea.   UA   PO hydration  Tylenol and felxeril for pelvic pain

## 2025-04-01 NOTE — DISCHARGE INSTRUCTIONS

## 2025-04-01 NOTE — DISCHARGE SUMMARY
[facility] - [facility]  Obstetrics  Discharge Summary      Patient Name: Janelle Clemente  MRN: [id number]  Admission Date: 3/31/2025  Hospital Length of Stay: 0 days  Discharge Date and Time: 2025 8:25 PM  Attending Physician: Chantale Mcclure MD   Discharging Provider: Agata Mtz CNM   Primary Care Provider: Dasia Mijares MD    HPI: 24 y.o.  25w5d with c/o pelvic pain/pressure and some cramping that started right before coming to LD      FHT: 150Cat 1 (reassuring)  TOCO:  none    * No surgery found *     Hospital Course:   EFM  Speculum exam performed. Cervix closed, negative pooling, scant amount of normal leukorrhea.   UA   PO hydration  Tylenol and felxeril for pelvic pain      3/31/25 @ 2030:  EFM reassuring.   UA negative   Patient reports feeling much better.  Stable for discharge.  Discharge instructions reviewed.Return to LD if experiencing decreased fetal movement, leaking of fluid, contractions, and/or vaginal bleeding    Keep next scheduled  appt           Final Active Diagnoses:    Diagnosis Date Noted POA    PRINCIPAL PROBLEM:  Pelvic pain affecting pregnancy in second trimester, antepartum [O26.892, R10.2] 2025 Unknown      Problems Resolved During this Admission:        Significant Diagnostic Studies: Labs: All labs within the past 24 hours have been reviewed    Immunizations       None            This patient has no babies on file.  Pending Diagnostic Studies:       None            Discharged Condition: good    Disposition: Home or Self Care    Follow Up:    Patient Instructions:      Notify your health care provider if you experience any of the following:  temperature >100.4     Notify your health care provider if you experience any of the following:  persistent nausea and vomiting or diarrhea     Notify your health care provider if you experience any of the following:  severe uncontrolled pain     Notify your health care provider if you experience any of the following:   difficulty breathing or increased cough     Notify your health care provider if you experience any of the following:  severe persistent headache     Notify your health care provider if you experience any of the following:  persistent dizziness, light-headedness, or visual disturbances     Notify your health care provider if you experience any of the following:  increased confusion or weakness     Notify your health care provider if you experience any of the following:   Order Comments: Return to LD if experiencing decreased fetal movement, leaking of fluid, contractions, and/or vaginal bleeding     Activity as tolerated     Medications:  Current Discharge Medication List        CONTINUE these medications which have NOT CHANGED    Details   acetaminophen (TYLENOL) 500 MG tablet Take 500 mg by mouth every 6 (six) hours as needed.      aspirin (ECOTRIN) 81 MG EC tablet Take 1 tablet (81 mg total) by mouth once daily.      famotidine (PEPCID) 20 MG tablet Take 20 mg by mouth.      ondansetron (ZOFRAN-ODT) 4 MG TbDL Take 1 tablet (4 mg total) by mouth every 6 (six) hours as needed.  Qty: 25 tablet, Refills: 1    Associated Diagnoses: Nausea/vomiting in pregnancy      prenatal 25/iron/folate 6/dha (PRENA1 ORAL) Take by mouth.             Agata Mtz CNM  Obstetrics  [facility]

## 2025-04-01 NOTE — SUBJECTIVE & OBJECTIVE
Obstetric HPI:  Patient reports cramping, active fetal movement, No vaginal bleeding , No loss of fluid     This pregnancy has been complicated by pyelectasis     OB History    Para Term  AB Living   2 0 0 0 1 0   SAB IAB Ectopic Multiple Live Births   1 0 0 0 0      # Outcome Date GA Lbr Eloy/2nd Weight Sex Type Anes PTL Lv   2 Current            1 SAB 2024 7w0d            No past medical history on file.  Past Surgical History:   Procedure Laterality Date    DILATION AND CURETTAGE OF UTERUS      2024       PTA Medications   Medication Sig    acetaminophen (TYLENOL) 500 MG tablet Take 500 mg by mouth every 6 (six) hours as needed.    aspirin (ECOTRIN) 81 MG EC tablet Take 1 tablet (81 mg total) by mouth once daily.    famotidine (PEPCID) 20 MG tablet Take 20 mg by mouth.    ondansetron (ZOFRAN-ODT) 4 MG TbDL Take 1 tablet (4 mg total) by mouth every 6 (six) hours as needed.    prenatal 25/iron/folate 6/dha (PRENA1 ORAL) Take by mouth.       Review of patient's allergies indicates:  No Known Allergies     Family History    None       Tobacco Use    Smoking status: Never     Passive exposure: Never    Smokeless tobacco: Never   Substance and Sexual Activity    Alcohol use: Not Currently    Drug use: Never    Sexual activity: Yes     Partners: Male     Review of Systems   Gastrointestinal:  Positive for abdominal pain.   Genitourinary:  Positive for pelvic pain.   All other systems reviewed and are negative.     Objective:     Vital Signs (Most Recent):    Vital Signs (24h Range):           There is no height or weight on file to calculate BMI.    FHT: 150Cat 1 (reassuring)  TOCO:  none     Physical Exam:   Constitutional: She is oriented to person, place, and time. She appears well-developed and well-nourished.       Cardiovascular:  Normal rate.             Pulmonary/Chest: Effort normal. No respiratory distress.        Abdominal: Soft.     Genitourinary:    Vagina and uterus normal.              Musculoskeletal: Moves all extremeties.       Neurological: She is alert and oriented to person, place, and time.    Skin: Skin is warm and dry.    Psychiatric: She has a normal mood and affect.        Cervix: per speculum  Dilation:  0       Significant Labs:  Lab Results   Component Value Date    GROUPTR A POS 11/07/2024    HEPBSAG Non-reactive 11/07/2024       I have personallly reviewed all pertinent lab results from the last 24 hours.

## 2025-04-01 NOTE — HPI
24 y.o.  25w5d with c/o pelvic pain/pressure and some cramping that started right before coming to LD

## 2025-04-01 NOTE — H&P
[facility] - [facility]  Obstetrics  History & Physical    Patient Name: Janelle Clemente  MRN: [id number]  Admission Date: 3/31/2025  Primary Care Provider: Dasia Mijares MD    Subjective:     Principal Problem:Pelvic pain affecting pregnancy in second trimester, antepartum    History of Present Illness:  24 y.o.  25w5d with c/o pelvic pain/pressure and some cramping that started right before coming to LD      Obstetric HPI:  Patient reports cramping, active fetal movement, No vaginal bleeding , No loss of fluid     This pregnancy has been complicated by pyelectasis     OB History    Para Term  AB Living   2 0 0 0 1 0   SAB IAB Ectopic Multiple Live Births   1 0 0 0 0      # Outcome Date GA Lbr Eloy/2nd Weight Sex Type Anes PTL Lv   2 Current            1 SAB 2024 7w0d            No past medical history on file.  Past Surgical History:   Procedure Laterality Date    DILATION AND CURETTAGE OF UTERUS      2024       PTA Medications   Medication Sig    acetaminophen (TYLENOL) 500 MG tablet Take 500 mg by mouth every 6 (six) hours as needed.    aspirin (ECOTRIN) 81 MG EC tablet Take 1 tablet (81 mg total) by mouth once daily.    famotidine (PEPCID) 20 MG tablet Take 20 mg by mouth.    ondansetron (ZOFRAN-ODT) 4 MG TbDL Take 1 tablet (4 mg total) by mouth every 6 (six) hours as needed.    prenatal 25/iron/folate 6/dha (PRENA1 ORAL) Take by mouth.       Review of patient's allergies indicates:  No Known Allergies     Family History    None       Tobacco Use    Smoking status: Never     Passive exposure: Never    Smokeless tobacco: Never   Substance and Sexual Activity    Alcohol use: Not Currently    Drug use: Never    Sexual activity: Yes     Partners: Male     Review of Systems   Gastrointestinal:  Positive for abdominal pain.   Genitourinary:  Positive for pelvic pain.   All other systems reviewed and are negative.     Objective:     Vital Signs (Most Recent):    Vital Signs (24h  Range):           There is no height or weight on file to calculate BMI.    FHT: 150Cat 1 (reassuring)  TOCO:  none     Physical Exam:   Constitutional: She is oriented to person, place, and time. She appears well-developed and well-nourished.       Cardiovascular:  Normal rate.             Pulmonary/Chest: Effort normal. No respiratory distress.        Abdominal: Soft.     Genitourinary:    Vagina and uterus normal.             Musculoskeletal: Moves all extremeties.       Neurological: She is alert and oriented to person, place, and time.    Skin: Skin is warm and dry.    Psychiatric: She has a normal mood and affect.        Cervix: per speculum  Dilation:  0       Significant Labs:  Lab Results   Component Value Date    GROUPTRH A POS 2024    HEPBSAG Non-reactive 2024       I have personallly reviewed all pertinent lab results from the last 24 hours.  Assessment/Plan:     24 y.o. female  at 25w5d for:    * Pelvic pain affecting pregnancy in second trimester, antepartum  EFM  Speculum exam performed. Cervix closed, negative pooling, scant amount of normal leukorrhea.   UA   PO hydration  Tylenol and felxeril for pelvic pain        Agata Mtz CNM  Obstetrics  [facility]

## 2025-04-01 NOTE — HOSPITAL COURSE
EFM  Speculum exam performed. Cervix closed, negative pooling, scant amount of normal leukorrhea.   UA   PO hydration  Tylenol and felxeril for pelvic pain      3/31/25 @ 2030:  EFM reassuring.   UA negative   Patient reports feeling much better.  Stable for discharge.  Discharge instructions reviewed.Return to LD if experiencing decreased fetal movement, leaking of fluid, contractions, and/or vaginal bleeding    Keep next scheduled  appt

## 2025-04-02 ENCOUNTER — PATIENT MESSAGE (OUTPATIENT)
Dept: OTHER | Facility: OTHER | Age: 25
End: 2025-04-02
Payer: MEDICAID

## 2025-04-02 ENCOUNTER — CLINICAL SUPPORT (OUTPATIENT)
Dept: REHABILITATION | Facility: HOSPITAL | Age: 25
End: 2025-04-02
Payer: MEDICAID

## 2025-04-02 DIAGNOSIS — M54.41 ACUTE BILATERAL LOW BACK PAIN WITH BILATERAL SCIATICA: Primary | ICD-10-CM

## 2025-04-02 DIAGNOSIS — R29.898 HIP WEAKNESS: ICD-10-CM

## 2025-04-02 DIAGNOSIS — M54.42 ACUTE BILATERAL LOW BACK PAIN WITH BILATERAL SCIATICA: Primary | ICD-10-CM

## 2025-04-02 PROCEDURE — 97110 THERAPEUTIC EXERCISES: CPT | Mod: PN

## 2025-04-02 PROCEDURE — 97112 NEUROMUSCULAR REEDUCATION: CPT | Mod: PN

## 2025-04-02 NOTE — PROGRESS NOTES
"  Outpatient Rehab    Physical Therapy Visit    Patient Name: Janelle Clemente  MRN: [id number]  YOB: 2000  Encounter Date: 4/2/2025    Therapy Diagnosis:   Encounter Diagnoses   Name Primary?    Acute bilateral low back pain with bilateral sciatica Yes    Hip weakness      Physician: Lana Delgado CNM    Physician Orders: Eval and Treat  Medical Diagnosis:   M54.30 (ICD-10-CM) - Sciatic nerve pain, unspecified laterality         Visit # / Visits Authorized:  6 / 12   Date of Evaluation:  2/13/2025   Insurance Authorization Period: 2/10/25 to 2/10/26  Plan of Care Certification:  2/13/2025 to 4/10/25     Time In:   11:00 AM  Time Out:  12:00 PM  Total Time:   60 minutes  Total Billable Time: 60 minutes    FOTO:  Intake Score:  %  Survey Score 1:  %  Survey Score 2:  %         Subjective   Had cramping recently and thought she was having contractions. Went to the ER and was told all is well..  Pain reported as 2/10.      Objective            Treatment  therapeutic exercises to develop strength, endurance, ROM, and flexibility for 10 minutes including:  Recumbent bike (5')  Cat\cow (15 x)        manual therapy techniques: Joint mobilizations, Manual traction, and Soft tissue Mobilization were applied to the: - for - minutes, including:  -     neuromuscular re-education activities to improve: Balance, Coordination, Kinesthetic, Sense, Proprioception, and Posture for 50 minutes. The following activities were included:  QP TA (10 x 10")  QP Alt LE (2 x 10)  Fire hydrants (2 x 10)  PPT on ball (2 x 10)  Pelvic rocking on ball (2')  Seated isometric hip abduction (2 x 10, 10", BlueTB)  Seated isometric hip adduction (2 x 10, 10")  Paloff press (2 x 15, 30 lbs)     therapeutic activities to improve functional performance for - minutes, including:  Testing of usefulness of pelvic belt     gait training to improve functional mobility and safety for - minutes, including:  -     Pt participated in moist heat " for -' following treatment.    Assessment & Plan   Assessment: Pt demonstrates good tolerance to advancement in exercise today. Pt demonstrates poor tolerance to semi reclined positioning but is better able to perform exericse in seated or QP positions.       Patient will continue to benefit from skilled outpatient physical therapy to address the deficits listed in the problem list box on initial evaluation, provide pt/family education and to maximize pt's level of independence in the home and community environment.     Patient's spiritual, cultural, and educational needs considered and patient agreeable to plan of care and goals.           Plan: Planned therapy interventions include: Therapeutic exercise, Therapeutic activities, Neuromuscular re-education, Manual therapy, ADLs/IADLs, Gait training, and Orthotic management and training.    Planned modalities to include: Biofeedback, Cryotherapy (cold pack), Electrical stimulation - attended, Electrical stimulation - passive/unattended, Mechanical traction, Thermotherapy (hot pack), and Ultrasound.         Visit Frequency: 2 times Per Week for 8 Weeks.    Goals:   Active       Functional outcome       Patient stated goal: Decrease pain with work duties.  (Progressing)       Start:  02/13/25    Expected End:  04/10/25            Patient will demonstrate independence in home program for support of progression (Met)       Start:  02/13/25    Expected End:  03/13/25    Resolved:  03/28/25            Strength       Patient will achieve bilateral hip flexion strength of 4+/5 (Progressing)       Start:  02/13/25    Expected End:  04/10/25            Patient will achieve bilateral hip extension strength of 4+/5 (Progressing)       Start:  02/13/25    Expected End:  04/10/25            Patient will achieve bilateral hip abduction strength of 4+/5 (Progressing)       Start:  02/13/25    Expected End:  04/10/25                Lou Castro, PT

## 2025-04-09 ENCOUNTER — E-VISIT (OUTPATIENT)
Dept: OBSTETRICS AND GYNECOLOGY | Facility: CLINIC | Age: 25
End: 2025-04-09
Payer: MEDICAID

## 2025-04-09 ENCOUNTER — TELEPHONE (OUTPATIENT)
Dept: OBSTETRICS AND GYNECOLOGY | Facility: CLINIC | Age: 25
End: 2025-04-09

## 2025-04-09 ENCOUNTER — NURSE TRIAGE (OUTPATIENT)
Dept: ADMINISTRATIVE | Facility: CLINIC | Age: 25
End: 2025-04-09
Payer: MEDICAID

## 2025-04-09 DIAGNOSIS — Z3A.27 27 WEEKS GESTATION OF PREGNANCY: ICD-10-CM

## 2025-04-09 DIAGNOSIS — K62.5 RECTAL BLEEDING: Primary | ICD-10-CM

## 2025-04-09 RX ORDER — HYDROCORTISONE 25 MG/G
CREAM TOPICAL 2 TIMES DAILY
Qty: 28.35 G | Refills: 0 | Status: SHIPPED | OUTPATIENT
Start: 2025-04-09

## 2025-04-09 NOTE — TELEPHONE ENCOUNTER
Contacted Janelle Clemente regarding triage phone call, verified 2 patient identifiers.  Patient states she is feeling better than she did when she spoke with triage., denies pain at this time. Received  from CNM via evisit.  Agrees with recommendation. Declines appt for this week. States she will keep appointment for next week.

## 2025-04-09 NOTE — PROGRESS NOTES
Patient ID: Janelle Clemente is a 24 y.o. female.    Chief Complaint: General Illness (Entered automatically based on patient selection in Perfect.)    The patient initiated a request through Perfect on 4/9/2025 for evaluation and management with a chief complaint of General Illness (Entered automatically based on patient selection in Perfect.)     I evaluated the questionnaire submission on 4/9/25.    No questionnaires on file.    Encounter Diagnoses   Name Primary?    Rectal bleeding Yes    27 weeks gestation of pregnancy         No orders of the defined types were placed in this encounter.     Medications Ordered This Encounter   Medications    hydrocortisone 2.5 % cream     Sig: Apply topically 2 (two) times daily.     Dispense:  28 g     Refill:  0        No follow-ups on file.      E-Visit Time Tracking:

## 2025-04-09 NOTE — TELEPHONE ENCOUNTER
Pt is 27 weeks pregnant. She had a bowel movement. After the bowel movement she was bleeding a lot. She is swollen from vaginal area to anus. Pain 3/10 vaginally. Pt is not sure if she is still bleeding. Pt stated it was a lot of blood but not severe. Pt stated she is lightheaded. But toilet water didn't turn red. Care advice recommends pt go to Er. Pt verbalized understanding.   Reason for Disposition   Bloody, black, or tarry bowel movements  (Exception: Chronic-unchanged black-grey bowel movements and is taking iron pills or Pepto-Bismol.)    Additional Information   Negative: Passed out (e.g., fainted, lost consciousness, blacked out and was not responding)   Negative: Shock suspected (e.g., cold/pale/clammy skin, too weak to stand, low BP, rapid pulse)   Negative: Vomiting red blood or black (coffee ground) material   Negative: Sounds like a life-threatening emergency to the triager   Negative: SEVERE rectal bleeding (large blood clots; constant or on and off bleeding)   Negative: SEVERE dizziness (e.g., unable to stand, requires support to walk, feels like passing out now)   Negative: MODERATE rectal bleeding (small blood clots, passing blood without stool, or toilet water turns red) more than once a day    Protocols used: Rectal Bleeding-A-OH

## 2025-04-16 ENCOUNTER — LAB VISIT (OUTPATIENT)
Dept: LAB | Facility: HOSPITAL | Age: 25
End: 2025-04-16
Payer: MEDICAID

## 2025-04-16 ENCOUNTER — PATIENT MESSAGE (OUTPATIENT)
Dept: OTHER | Facility: OTHER | Age: 25
End: 2025-04-16
Payer: MEDICAID

## 2025-04-16 ENCOUNTER — ROUTINE PRENATAL (OUTPATIENT)
Dept: OBSTETRICS AND GYNECOLOGY | Facility: CLINIC | Age: 25
End: 2025-04-16
Payer: MEDICAID

## 2025-04-16 VITALS
SYSTOLIC BLOOD PRESSURE: 108 MMHG | DIASTOLIC BLOOD PRESSURE: 70 MMHG | BODY MASS INDEX: 35.12 KG/M2 | WEIGHT: 185.88 LBS

## 2025-04-16 DIAGNOSIS — Z3A.28 28 WEEKS GESTATION OF PREGNANCY: Primary | ICD-10-CM

## 2025-04-16 DIAGNOSIS — O02.1 MISSED AB: ICD-10-CM

## 2025-04-16 DIAGNOSIS — O35.EXX0 PYELECTASIS OF FETUS ON PRENATAL ULTRASOUND: ICD-10-CM

## 2025-04-16 DIAGNOSIS — M54.41 ACUTE BILATERAL LOW BACK PAIN WITH BILATERAL SCIATICA: ICD-10-CM

## 2025-04-16 DIAGNOSIS — M54.42 ACUTE BILATERAL LOW BACK PAIN WITH BILATERAL SCIATICA: ICD-10-CM

## 2025-04-16 DIAGNOSIS — Z3A.25 25 WEEKS GESTATION OF PREGNANCY: ICD-10-CM

## 2025-04-16 DIAGNOSIS — O21.9 NAUSEA/VOMITING IN PREGNANCY: ICD-10-CM

## 2025-04-16 PROBLEM — M54.32 BILATERAL SCIATICA: Status: RESOLVED | Noted: 2024-12-16 | Resolved: 2025-04-16

## 2025-04-16 PROBLEM — Z28.21 INFLUENZA VACCINE REFUSED: Status: RESOLVED | Noted: 2024-12-16 | Resolved: 2025-04-16

## 2025-04-16 PROBLEM — O46.8X9 SUBCHORIONIC HEMORRHAGE: Status: RESOLVED | Noted: 2024-12-04 | Resolved: 2025-04-16

## 2025-04-16 PROBLEM — M54.31 BILATERAL SCIATICA: Status: RESOLVED | Noted: 2024-12-16 | Resolved: 2025-04-16

## 2025-04-16 LAB
GLUCOSE SERPL-MCNC: 78 MG/DL (ref 70–140)
HIV 1+2 AB+HIV1 P24 AG SERPL QL IA: NORMAL
T PALLIDUM IGG+IGM SER QL: NORMAL

## 2025-04-16 PROCEDURE — 87389 HIV-1 AG W/HIV-1&-2 AB AG IA: CPT

## 2025-04-16 PROCEDURE — 90715 TDAP VACCINE 7 YRS/> IM: CPT | Mod: PBBFAC

## 2025-04-16 PROCEDURE — 99999 PR PBB SHADOW E&M-EST. PATIENT-LVL II: CPT | Mod: PBBFAC,,, | Performed by: MIDWIFE

## 2025-04-16 PROCEDURE — 82950 GLUCOSE TEST: CPT

## 2025-04-16 PROCEDURE — 86593 SYPHILIS TEST NON-TREP QUANT: CPT

## 2025-04-16 PROCEDURE — 99212 OFFICE O/P EST SF 10 MIN: CPT | Mod: PBBFAC,TH | Performed by: MIDWIFE

## 2025-04-16 PROCEDURE — 90471 IMMUNIZATION ADMIN: CPT | Mod: PBBFAC

## 2025-04-16 PROCEDURE — 84702 CHORIONIC GONADOTROPIN TEST: CPT

## 2025-04-16 PROCEDURE — 36415 COLL VENOUS BLD VENIPUNCTURE: CPT

## 2025-04-16 PROCEDURE — 99999PBSHW PR PBB SHADOW TECHNICAL ONLY FILED TO HB: Mod: PBBFAC,,,

## 2025-04-16 RX ORDER — ONDANSETRON 4 MG/1
4 TABLET, ORALLY DISINTEGRATING ORAL EVERY 6 HOURS PRN
Qty: 25 TABLET | Refills: 1 | Status: SHIPPED | OUTPATIENT
Start: 2025-04-16

## 2025-04-16 RX ADMIN — TETANUS TOXOID, REDUCED DIPHTHERIA TOXOID AND ACELLULAR PERTUSSIS VACCINE, ADSORBED 0.5 ML: 5; 2.5; 8; 8; 2.5 SUSPENSION INTRAMUSCULAR at 11:04

## 2025-04-16 NOTE — PROGRESS NOTES
28 weeks gestation of pregnancy  -     Tdap (BOOSTRIX) vaccine injection 0.5 mL    Doing well, good fetal movement  Denies contractions, VB, LOF  Discussed importance of hydration/protein intake in pregnancy  Stool softeners very helpful for constipation and rectal bleeding  3T labs and TDAP today  Questions answered about circumcision, care, r/b. Will continue to research.   25lb 5.6oz TWG, S=D  Kick counts and PTL precautions  RTC in 2 weeks, sooner if needed     Acute bilateral low back pain with bilateral sciatica    Continue PT     Pyelectasis of fetus on prenatal ultrasound  -     US OB/GYN Procedure ([facility])-Future; Future    F/u 32 weeks     Nausea/vomiting in pregnancy  -     ondansetron (ZOFRAN-ODT) 4 MG TbDL; Take 1 tablet (4 mg total) by mouth every 6 (six) hours as needed.  Dispense: 25 tablet; Refill: 1     Refill per request

## 2025-04-16 NOTE — PROGRESS NOTES
Patient seen in clinic today for routine OB.  Two patient identifiers identified.  Allergy and current medication list reviewed and updated with patient.  Patient notified to wait 15 minutes after receiving injection. Patient verbalized understanding.  TDAP vaccine administered IM to patient's LEFT deltoid.  Patient tolerated well.

## 2025-04-17 ENCOUNTER — PATIENT MESSAGE (OUTPATIENT)
Dept: OBSTETRICS AND GYNECOLOGY | Facility: CLINIC | Age: 25
End: 2025-04-17
Payer: MEDICAID

## 2025-04-17 LAB — HCG INTACT+B SERPL-ACNC: 7463.3 MIU/ML

## 2025-04-23 ENCOUNTER — RESULTS FOLLOW-UP (OUTPATIENT)
Dept: OBSTETRICS AND GYNECOLOGY | Facility: HOSPITAL | Age: 25
End: 2025-04-23

## 2025-04-23 DIAGNOSIS — Z3A.29 29 WEEKS GESTATION OF PREGNANCY: Primary | ICD-10-CM

## 2025-04-30 ENCOUNTER — PATIENT MESSAGE (OUTPATIENT)
Dept: OTHER | Facility: OTHER | Age: 25
End: 2025-04-30
Payer: MEDICAID

## 2025-05-01 ENCOUNTER — ROUTINE PRENATAL (OUTPATIENT)
Dept: OBSTETRICS AND GYNECOLOGY | Facility: CLINIC | Age: 25
End: 2025-05-01
Payer: MEDICAID

## 2025-05-01 ENCOUNTER — LAB VISIT (OUTPATIENT)
Dept: LAB | Facility: HOSPITAL | Age: 25
End: 2025-05-01
Attending: FAMILY MEDICINE
Payer: MEDICAID

## 2025-05-01 VITALS
WEIGHT: 188.69 LBS | DIASTOLIC BLOOD PRESSURE: 66 MMHG | BODY MASS INDEX: 35.66 KG/M2 | SYSTOLIC BLOOD PRESSURE: 118 MMHG

## 2025-05-01 DIAGNOSIS — Z3A.30 30 WEEKS GESTATION OF PREGNANCY: Primary | ICD-10-CM

## 2025-05-01 DIAGNOSIS — Z3A.29 29 WEEKS GESTATION OF PREGNANCY: ICD-10-CM

## 2025-05-01 DIAGNOSIS — O35.EXX0 PYELECTASIS OF FETUS ON PRENATAL ULTRASOUND: ICD-10-CM

## 2025-05-01 DIAGNOSIS — M54.42 ACUTE BILATERAL LOW BACK PAIN WITH BILATERAL SCIATICA: ICD-10-CM

## 2025-05-01 DIAGNOSIS — M54.41 ACUTE BILATERAL LOW BACK PAIN WITH BILATERAL SCIATICA: ICD-10-CM

## 2025-05-01 DIAGNOSIS — Z36.89 ENCOUNTER FOR ULTRASOUND TO ASSESS FETAL GROWTH: ICD-10-CM

## 2025-05-01 LAB
ABSOLUTE EOSINOPHIL (OHS): 0.13 K/UL
ABSOLUTE MONOCYTE (OHS): 0.8 K/UL (ref 0.3–1)
ABSOLUTE NEUTROPHIL COUNT (OHS): 6.21 K/UL (ref 1.8–7.7)
BASOPHILS # BLD AUTO: 0.02 K/UL
BASOPHILS NFR BLD AUTO: 0.2 %
ERYTHROCYTE [DISTWIDTH] IN BLOOD BY AUTOMATED COUNT: 13.2 % (ref 11.5–14.5)
HCT VFR BLD AUTO: 27.4 % (ref 37–48.5)
HGB BLD-MCNC: 8.2 GM/DL (ref 12–16)
IMM GRANULOCYTES # BLD AUTO: 0.05 K/UL (ref 0–0.04)
IMM GRANULOCYTES NFR BLD AUTO: 0.5 % (ref 0–0.5)
LYMPHOCYTES # BLD AUTO: 2 K/UL (ref 1–4.8)
MCH RBC QN AUTO: 27 PG (ref 27–31)
MCHC RBC AUTO-ENTMCNC: 29.9 G/DL (ref 32–36)
MCV RBC AUTO: 90 FL (ref 82–98)
NUCLEATED RBC (/100WBC) (OHS): 0 /100 WBC
PLATELET # BLD AUTO: 335 K/UL (ref 150–450)
PMV BLD AUTO: 10.6 FL (ref 9.2–12.9)
RBC # BLD AUTO: 3.04 M/UL (ref 4–5.4)
RELATIVE EOSINOPHIL (OHS): 1.4 %
RELATIVE LYMPHOCYTE (OHS): 21.7 % (ref 18–48)
RELATIVE MONOCYTE (OHS): 8.7 % (ref 4–15)
RELATIVE NEUTROPHIL (OHS): 67.5 % (ref 38–73)
WBC # BLD AUTO: 9.21 K/UL (ref 3.9–12.7)

## 2025-05-01 PROCEDURE — 85025 COMPLETE CBC W/AUTO DIFF WBC: CPT

## 2025-05-01 PROCEDURE — 36415 COLL VENOUS BLD VENIPUNCTURE: CPT

## 2025-05-01 PROCEDURE — 99212 OFFICE O/P EST SF 10 MIN: CPT | Mod: PBBFAC,TH | Performed by: MIDWIFE

## 2025-05-01 PROCEDURE — 99999 PR PBB SHADOW E&M-EST. PATIENT-LVL II: CPT | Mod: PBBFAC,,, | Performed by: MIDWIFE

## 2025-05-01 NOTE — PROGRESS NOTES
30 weeks gestation of pregnancy    Doing well, good fetal movement  Having some BH, no labor pattern. No VB or LOF.  Reviewed 3T labs. CBC not linked. Will do today.   Discussed birth plan. Patient desires an epidural for pain management. Discussed rooming in, skin to skin, delayed cord clamping, and magic first hour.   28lb 3.5oz TWG, S=D  Kick counts and PTL precautions  RTC in 2 weeks with U/S, sooner if needed      Encounter for ultrasound to assess fetal growth  -     US OB/GYN Procedure ([facility])-Future; Future; Expected date: 08/01/2025    Acute bilateral low back pain with bilateral sciatica    Continue physical therapy     Pyelectasis of fetus on prenatal ultrasound     F/u 32 weeks

## 2025-05-03 ENCOUNTER — RESULTS FOLLOW-UP (OUTPATIENT)
Dept: OBSTETRICS AND GYNECOLOGY | Facility: HOSPITAL | Age: 25
End: 2025-05-03

## 2025-05-03 DIAGNOSIS — O99.013 ANEMIA DURING PREGNANCY IN THIRD TRIMESTER: Primary | ICD-10-CM

## 2025-05-03 RX ORDER — FERROUS SULFATE 325(65) MG
325 TABLET, DELAYED RELEASE (ENTERIC COATED) ORAL 2 TIMES DAILY
Qty: 60 TABLET | Refills: 5 | Status: SHIPPED | OUTPATIENT
Start: 2025-05-03

## 2025-05-12 PROBLEM — Z34.93 NORMAL PREGNANCY IN THIRD TRIMESTER: Status: ACTIVE | Noted: 2024-12-16

## 2025-05-14 ENCOUNTER — PATIENT MESSAGE (OUTPATIENT)
Dept: OTHER | Facility: OTHER | Age: 25
End: 2025-05-14
Payer: MEDICAID

## 2025-05-15 ENCOUNTER — PROCEDURE VISIT (OUTPATIENT)
Dept: OBSTETRICS AND GYNECOLOGY | Facility: CLINIC | Age: 25
End: 2025-05-15
Payer: MEDICAID

## 2025-05-15 ENCOUNTER — PATIENT MESSAGE (OUTPATIENT)
Dept: OBSTETRICS AND GYNECOLOGY | Facility: CLINIC | Age: 25
End: 2025-05-15

## 2025-05-15 ENCOUNTER — ROUTINE PRENATAL (OUTPATIENT)
Dept: OBSTETRICS AND GYNECOLOGY | Facility: CLINIC | Age: 25
End: 2025-05-15
Payer: MEDICAID

## 2025-05-15 VITALS
WEIGHT: 188.25 LBS | DIASTOLIC BLOOD PRESSURE: 60 MMHG | SYSTOLIC BLOOD PRESSURE: 118 MMHG | BODY MASS INDEX: 35.57 KG/M2

## 2025-05-15 DIAGNOSIS — M54.41 ACUTE BILATERAL LOW BACK PAIN WITH BILATERAL SCIATICA: ICD-10-CM

## 2025-05-15 DIAGNOSIS — O99.013 ANEMIA DURING PREGNANCY IN THIRD TRIMESTER: ICD-10-CM

## 2025-05-15 DIAGNOSIS — O35.EXX0 PYELECTASIS OF FETUS ON PRENATAL ULTRASOUND: ICD-10-CM

## 2025-05-15 DIAGNOSIS — Z3A.32 32 WEEKS GESTATION OF PREGNANCY: Primary | ICD-10-CM

## 2025-05-15 DIAGNOSIS — M54.42 ACUTE BILATERAL LOW BACK PAIN WITH BILATERAL SCIATICA: ICD-10-CM

## 2025-05-15 PROCEDURE — 99212 OFFICE O/P EST SF 10 MIN: CPT | Mod: PBBFAC,TH | Performed by: MIDWIFE

## 2025-05-15 PROCEDURE — 76816 OB US FOLLOW-UP PER FETUS: CPT | Mod: PBBFAC | Performed by: OBSTETRICS & GYNECOLOGY

## 2025-05-15 PROCEDURE — 99999 PR PBB SHADOW E&M-EST. PATIENT-LVL II: CPT | Mod: PBBFAC,,, | Performed by: MIDWIFE

## 2025-05-15 NOTE — PROGRESS NOTES
32 weeks gestation of pregnancy    Doing well, good fetal movement  Some BH. No regular contractions, VB, LOF.   Plans condoms for contraception PP  L&D card given --plans unit tour soon  Undecided on pediatrician. List given & encouraged meet and greet visits.   27lb 12.5oz TWG, S=D  U/S today: VTX, posterior placenta, MVP 7.2, growth 63%tile, EFW 5lb 8oz, right kidney 0.58cm, left kidney: 0.71cm.  Kick counts and PTL precautions  RTC in 2 weeks, sooner if needed     Anemia during pregnancy in third trimester    PO iron supplement     Pyelectasis of fetus on prenatal ultrasound  -     US OB/GYN Procedure ([facility])-Future; Future    Acute bilateral low back pain with bilateral sciatica     PT

## 2025-05-31 ENCOUNTER — HOSPITAL ENCOUNTER (OUTPATIENT)
Facility: HOSPITAL | Age: 25
Discharge: HOME | End: 2025-06-01
Attending: OBSTETRICS & GYNECOLOGY | Admitting: OBSTETRICS & GYNECOLOGY
Payer: MEDICAID

## 2025-05-31 DIAGNOSIS — O26.899 ABDOMINAL PAIN AFFECTING PREGNANCY: ICD-10-CM

## 2025-05-31 DIAGNOSIS — B96.89 BACTERIAL VAGINOSIS: Primary | ICD-10-CM

## 2025-05-31 DIAGNOSIS — N76.0 BACTERIAL VAGINOSIS: Primary | ICD-10-CM

## 2025-05-31 DIAGNOSIS — R10.9 ABDOMINAL PAIN AFFECTING PREGNANCY: ICD-10-CM

## 2025-05-31 PROBLEM — O26.893 ABDOMINAL PAIN DURING PREGNANCY IN THIRD TRIMESTER: Status: ACTIVE | Noted: 2025-05-31

## 2025-05-31 LAB
ABSOLUTE EOSINOPHIL (OHS): 0.12 K/UL
ABSOLUTE MONOCYTE (OHS): 0.81 K/UL (ref 0.3–1)
ABSOLUTE NEUTROPHIL COUNT (OHS): 8.3 K/UL (ref 1.8–7.7)
ALBUMIN SERPL BCP-MCNC: 2.7 G/DL (ref 3.5–5.2)
ALP SERPL-CCNC: 192 UNIT/L (ref 40–150)
ALT SERPL W/O P-5'-P-CCNC: 12 UNIT/L (ref 10–44)
ANION GAP (OHS): 8 MMOL/L (ref 8–16)
AST SERPL-CCNC: 15 UNIT/L (ref 11–45)
BACTERIA #/AREA URNS AUTO: ABNORMAL /HPF
BASOPHILS # BLD AUTO: 0.03 K/UL
BASOPHILS NFR BLD AUTO: 0.3 %
BILIRUB SERPL-MCNC: 0.3 MG/DL (ref 0.1–1)
BILIRUB UR QL STRIP.AUTO: NEGATIVE
BUN SERPL-MCNC: 7 MG/DL (ref 6–20)
CALCIUM SERPL-MCNC: 9 MG/DL (ref 8.7–10.5)
CHLORIDE SERPL-SCNC: 108 MMOL/L (ref 95–110)
CLARITY UR: ABNORMAL
CO2 SERPL-SCNC: 18 MMOL/L (ref 23–29)
COLOR UR AUTO: YELLOW
CREAT SERPL-MCNC: 0.6 MG/DL (ref 0.5–1.4)
ERYTHROCYTE [DISTWIDTH] IN BLOOD BY AUTOMATED COUNT: 13.7 % (ref 11.5–14.5)
GFR SERPLBLD CREATININE-BSD FMLA CKD-EPI: >60 ML/MIN/1.73/M2
GLUCOSE SERPL-MCNC: 78 MG/DL (ref 70–110)
GLUCOSE UR QL STRIP: NEGATIVE
HCT VFR BLD AUTO: 28.3 % (ref 37–48.5)
HGB BLD-MCNC: 9 GM/DL (ref 12–16)
HGB UR QL STRIP: NEGATIVE
HOLD SPECIMEN: NORMAL
IMM GRANULOCYTES # BLD AUTO: 0.05 K/UL (ref 0–0.04)
IMM GRANULOCYTES NFR BLD AUTO: 0.4 % (ref 0–0.5)
KETONES UR QL STRIP: NEGATIVE
LEUKOCYTE ESTERASE UR QL STRIP: ABNORMAL
LYMPHOCYTES # BLD AUTO: 2.49 K/UL (ref 1–4.8)
MCH RBC QN AUTO: 25.6 PG (ref 27–31)
MCHC RBC AUTO-ENTMCNC: 31.8 G/DL (ref 32–36)
MCV RBC AUTO: 81 FL (ref 82–98)
MICROSCOPIC COMMENT: ABNORMAL
NITRITE UR QL STRIP: NEGATIVE
NUCLEATED RBC (/100WBC) (OHS): 0 /100 WBC
PH UR STRIP: 7 [PH]
PLATELET # BLD AUTO: 319 K/UL (ref 150–450)
PMV BLD AUTO: 10.1 FL (ref 9.2–12.9)
POTASSIUM SERPL-SCNC: 3.9 MMOL/L (ref 3.5–5.1)
PROT SERPL-MCNC: 6.9 GM/DL (ref 6–8.4)
PROT UR QL STRIP: ABNORMAL
RBC # BLD AUTO: 3.51 M/UL (ref 4–5.4)
RBC #/AREA URNS AUTO: 3 /HPF (ref 0–4)
RELATIVE EOSINOPHIL (OHS): 1 %
RELATIVE LYMPHOCYTE (OHS): 21.1 % (ref 18–48)
RELATIVE MONOCYTE (OHS): 6.9 % (ref 4–15)
RELATIVE NEUTROPHIL (OHS): 70.3 % (ref 38–73)
SODIUM SERPL-SCNC: 134 MMOL/L (ref 136–145)
SP GR UR STRIP: 1.02
SQUAMOUS #/AREA URNS AUTO: 36 /HPF
UNSPECIFIED CRY UR QL COMP ASSIST: ABNORMAL
UROBILINOGEN UR STRIP-ACNC: NEGATIVE EU/DL
WBC # BLD AUTO: 11.8 K/UL (ref 3.9–12.7)
WBC #/AREA URNS AUTO: 7 /HPF (ref 0–5)

## 2025-05-31 PROCEDURE — 80053 COMPREHEN METABOLIC PANEL: CPT | Performed by: ADVANCED PRACTICE MIDWIFE

## 2025-05-31 PROCEDURE — 81003 URINALYSIS AUTO W/O SCOPE: CPT | Performed by: ADVANCED PRACTICE MIDWIFE

## 2025-05-31 PROCEDURE — 99211 OFF/OP EST MAY X REQ PHY/QHP: CPT

## 2025-05-31 PROCEDURE — 59025 FETAL NON-STRESS TEST: CPT

## 2025-05-31 PROCEDURE — 25000003 PHARM REV CODE 250: Performed by: ADVANCED PRACTICE MIDWIFE

## 2025-05-31 PROCEDURE — 63600175 PHARM REV CODE 636 W HCPCS: Performed by: ADVANCED PRACTICE MIDWIFE

## 2025-05-31 PROCEDURE — 85025 COMPLETE CBC W/AUTO DIFF WBC: CPT | Performed by: ADVANCED PRACTICE MIDWIFE

## 2025-05-31 RX ORDER — MORPHINE SULFATE 10 MG/ML
10 INJECTION INTRAMUSCULAR; INTRAVENOUS; SUBCUTANEOUS ONCE
Status: COMPLETED | OUTPATIENT
Start: 2025-05-31 | End: 2025-05-31

## 2025-05-31 RX ORDER — ONDANSETRON 8 MG/1
8 TABLET, ORALLY DISINTEGRATING ORAL EVERY 8 HOURS PRN
Status: DISCONTINUED | OUTPATIENT
Start: 2025-05-31 | End: 2025-06-01 | Stop reason: HOSPADM

## 2025-05-31 RX ORDER — SODIUM CHLORIDE, SODIUM LACTATE, POTASSIUM CHLORIDE, CALCIUM CHLORIDE 600; 310; 30; 20 MG/100ML; MG/100ML; MG/100ML; MG/100ML
INJECTION, SOLUTION INTRAVENOUS CONTINUOUS
Status: DISCONTINUED | OUTPATIENT
Start: 2025-05-31 | End: 2025-06-01 | Stop reason: HOSPADM

## 2025-05-31 RX ORDER — HYDROXYZINE HYDROCHLORIDE 25 MG/ML
25 INJECTION, SOLUTION INTRAMUSCULAR ONCE
Status: COMPLETED | OUTPATIENT
Start: 2025-05-31 | End: 2025-05-31

## 2025-05-31 RX ORDER — ACETAMINOPHEN 500 MG
500 TABLET ORAL EVERY 6 HOURS PRN
Status: DISCONTINUED | OUTPATIENT
Start: 2025-05-31 | End: 2025-06-01 | Stop reason: HOSPADM

## 2025-05-31 RX ORDER — CYCLOBENZAPRINE HCL 10 MG
10 TABLET ORAL ONCE
Status: COMPLETED | OUTPATIENT
Start: 2025-05-31 | End: 2025-05-31

## 2025-05-31 RX ORDER — METRONIDAZOLE 500 MG/1
500 TABLET ORAL ONCE
Status: DISCONTINUED | OUTPATIENT
Start: 2025-05-31 | End: 2025-05-31

## 2025-05-31 RX ORDER — METRONIDAZOLE 500 MG/100ML
500 INJECTION, SOLUTION INTRAVENOUS ONCE
Status: COMPLETED | OUTPATIENT
Start: 2025-05-31 | End: 2025-05-31

## 2025-05-31 RX ORDER — TERBUTALINE SULFATE 1 MG/ML
0.25 INJECTION SUBCUTANEOUS ONCE
Status: COMPLETED | OUTPATIENT
Start: 2025-05-31 | End: 2025-05-31

## 2025-05-31 RX ADMIN — TERBUTALINE SULFATE 0.25 MG: 1 INJECTION SUBCUTANEOUS at 03:05

## 2025-05-31 RX ADMIN — SODIUM CHLORIDE, POTASSIUM CHLORIDE, SODIUM LACTATE AND CALCIUM CHLORIDE 1000 ML: 600; 310; 30; 20 INJECTION, SOLUTION INTRAVENOUS at 03:05

## 2025-05-31 RX ADMIN — SODIUM CHLORIDE, POTASSIUM CHLORIDE, SODIUM LACTATE AND CALCIUM CHLORIDE: 600; 310; 30; 20 INJECTION, SOLUTION INTRAVENOUS at 05:05

## 2025-05-31 RX ADMIN — METRONIDAZOLE 500 MG: 5 INJECTION, SOLUTION INTRAVENOUS at 03:05

## 2025-05-31 RX ADMIN — HYDROXYZINE HYDROCHLORIDE 25 MG: 25 INJECTION, SOLUTION INTRAMUSCULAR at 12:05

## 2025-05-31 RX ADMIN — MORPHINE SULFATE 10 MG: 10 INJECTION, SOLUTION INTRAMUSCULAR; INTRAVENOUS at 12:05

## 2025-05-31 RX ADMIN — SODIUM CHLORIDE, POTASSIUM CHLORIDE, SODIUM LACTATE AND CALCIUM CHLORIDE 1000 ML: 600; 310; 30; 20 INJECTION, SOLUTION INTRAVENOUS at 12:05

## 2025-05-31 RX ADMIN — CYCLOBENZAPRINE HYDROCHLORIDE 10 MG: 10 TABLET, FILM COATED ORAL at 05:05

## 2025-06-01 VITALS
TEMPERATURE: 98 F | HEART RATE: 98 BPM | HEIGHT: 61 IN | OXYGEN SATURATION: 97 % | BODY MASS INDEX: 35.54 KG/M2 | SYSTOLIC BLOOD PRESSURE: 121 MMHG | WEIGHT: 188.25 LBS | DIASTOLIC BLOOD PRESSURE: 73 MMHG | RESPIRATION RATE: 16 BRPM

## 2025-06-01 PROBLEM — O23.599 BACTERIAL VAGINOSIS IN PREGNANCY: Status: ACTIVE | Noted: 2025-06-01

## 2025-06-01 PROBLEM — O47.03 PRETERM UTERINE CONTRACTIONS IN THIRD TRIMESTER, ANTEPARTUM: Status: ACTIVE | Noted: 2025-06-01

## 2025-06-01 PROBLEM — B96.89 BACTERIAL VAGINOSIS IN PREGNANCY: Status: ACTIVE | Noted: 2025-06-01

## 2025-06-01 PROCEDURE — 99234 HOSP IP/OBS SM DT SF/LOW 45: CPT | Mod: 95,,, | Performed by: OBSTETRICS & GYNECOLOGY

## 2025-06-01 PROCEDURE — 25000003 PHARM REV CODE 250: Performed by: OBSTETRICS & GYNECOLOGY

## 2025-06-01 PROCEDURE — G0378 HOSPITAL OBSERVATION PER HR: HCPCS

## 2025-06-01 RX ORDER — METRONIDAZOLE 500 MG/1
500 TABLET ORAL EVERY 12 HOURS
Status: DISCONTINUED | OUTPATIENT
Start: 2025-06-01 | End: 2025-06-01 | Stop reason: HOSPADM

## 2025-06-01 RX ORDER — METRONIDAZOLE 500 MG/1
500 TABLET ORAL EVERY 12 HOURS
Qty: 14 TABLET | Refills: 0 | Status: SHIPPED | OUTPATIENT
Start: 2025-06-01

## 2025-06-01 RX ADMIN — METRONIDAZOLE 500 MG: 500 TABLET ORAL at 08:06

## 2025-06-02 ENCOUNTER — ROUTINE PRENATAL (OUTPATIENT)
Dept: OBSTETRICS AND GYNECOLOGY | Facility: CLINIC | Age: 25
End: 2025-06-02
Payer: MEDICAID

## 2025-06-02 VITALS
BODY MASS INDEX: 36.78 KG/M2 | WEIGHT: 194.69 LBS | SYSTOLIC BLOOD PRESSURE: 106 MMHG | DIASTOLIC BLOOD PRESSURE: 64 MMHG

## 2025-06-02 DIAGNOSIS — O99.013 ANEMIA DURING PREGNANCY IN THIRD TRIMESTER: ICD-10-CM

## 2025-06-02 DIAGNOSIS — Z34.83 ENCOUNTER FOR SUPERVISION OF OTHER NORMAL PREGNANCY, THIRD TRIMESTER: ICD-10-CM

## 2025-06-02 DIAGNOSIS — B96.89 BACTERIAL VAGINOSIS IN PREGNANCY: ICD-10-CM

## 2025-06-02 DIAGNOSIS — O23.599 BACTERIAL VAGINOSIS IN PREGNANCY: ICD-10-CM

## 2025-06-02 DIAGNOSIS — O35.EXX0 PYELECTASIS OF FETUS ON PRENATAL ULTRASOUND: Primary | ICD-10-CM

## 2025-06-02 PROCEDURE — 99213 OFFICE O/P EST LOW 20 MIN: CPT | Mod: PBBFAC,TH | Performed by: ADVANCED PRACTICE MIDWIFE

## 2025-06-02 PROCEDURE — 99999 PR PBB SHADOW E&M-EST. PATIENT-LVL III: CPT | Mod: PBBFAC,,, | Performed by: ADVANCED PRACTICE MIDWIFE

## 2025-06-02 PROCEDURE — 87653 STREP B DNA AMP PROBE: CPT | Performed by: ADVANCED PRACTICE MIDWIFE

## 2025-06-02 PROCEDURE — 99214 OFFICE O/P EST MOD 30 MIN: CPT | Mod: TH,S$PBB,, | Performed by: ADVANCED PRACTICE MIDWIFE

## 2025-06-03 ENCOUNTER — PATIENT MESSAGE (OUTPATIENT)
Dept: OBSTETRICS AND GYNECOLOGY | Facility: CLINIC | Age: 25
End: 2025-06-03
Payer: MEDICAID

## 2025-06-04 ENCOUNTER — PATIENT MESSAGE (OUTPATIENT)
Dept: OTHER | Facility: OTHER | Age: 25
End: 2025-06-04
Payer: MEDICAID

## 2025-06-04 LAB — GROUP B STREPTOCOCCUS, PCR (OHS): NEGATIVE

## 2025-06-11 ENCOUNTER — LAB VISIT (OUTPATIENT)
Dept: LAB | Facility: HOSPITAL | Age: 25
End: 2025-06-11
Attending: MIDWIFE
Payer: MEDICAID

## 2025-06-11 ENCOUNTER — ROUTINE PRENATAL (OUTPATIENT)
Dept: OBSTETRICS AND GYNECOLOGY | Facility: CLINIC | Age: 25
End: 2025-06-11
Payer: MEDICAID

## 2025-06-11 VITALS — WEIGHT: 192 LBS | SYSTOLIC BLOOD PRESSURE: 102 MMHG | DIASTOLIC BLOOD PRESSURE: 66 MMHG | BODY MASS INDEX: 36.28 KG/M2

## 2025-06-11 DIAGNOSIS — Z3A.36 36 WEEKS GESTATION OF PREGNANCY: Primary | ICD-10-CM

## 2025-06-11 DIAGNOSIS — O99.013 ANEMIA DURING PREGNANCY IN THIRD TRIMESTER: ICD-10-CM

## 2025-06-11 DIAGNOSIS — O35.EXX0 PYELECTASIS OF FETUS ON PRENATAL ULTRASOUND: ICD-10-CM

## 2025-06-11 DIAGNOSIS — L29.9 ITCHING: ICD-10-CM

## 2025-06-11 PROBLEM — O26.893 ABDOMINAL PAIN DURING PREGNANCY IN THIRD TRIMESTER: Status: RESOLVED | Noted: 2025-05-31 | Resolved: 2025-06-11

## 2025-06-11 PROBLEM — O47.03 PRETERM UTERINE CONTRACTIONS IN THIRD TRIMESTER, ANTEPARTUM: Status: RESOLVED | Noted: 2025-06-01 | Resolved: 2025-06-11

## 2025-06-11 PROBLEM — R10.9 ABDOMINAL PAIN DURING PREGNANCY IN THIRD TRIMESTER: Status: RESOLVED | Noted: 2025-05-31 | Resolved: 2025-06-11

## 2025-06-11 LAB
ALBUMIN SERPL BCP-MCNC: 2.7 G/DL (ref 3.5–5.2)
ALP SERPL-CCNC: 209 UNIT/L (ref 40–150)
ALT SERPL W/O P-5'-P-CCNC: 9 UNIT/L (ref 10–44)
ANION GAP (OHS): 8 MMOL/L (ref 8–16)
AST SERPL-CCNC: 15 UNIT/L (ref 11–45)
BILIRUB SERPL-MCNC: 0.3 MG/DL (ref 0.1–1)
BUN SERPL-MCNC: 8 MG/DL (ref 6–20)
CALCIUM SERPL-MCNC: 9.1 MG/DL (ref 8.7–10.5)
CHLORIDE SERPL-SCNC: 104 MMOL/L (ref 95–110)
CO2 SERPL-SCNC: 21 MMOL/L (ref 23–29)
CREAT SERPL-MCNC: 0.6 MG/DL (ref 0.5–1.4)
GFR SERPLBLD CREATININE-BSD FMLA CKD-EPI: >60 ML/MIN/1.73/M2
GLUCOSE SERPL-MCNC: 64 MG/DL (ref 70–110)
POTASSIUM SERPL-SCNC: 4.3 MMOL/L (ref 3.5–5.1)
PROT SERPL-MCNC: 6.8 GM/DL (ref 6–8.4)
SODIUM SERPL-SCNC: 133 MMOL/L (ref 136–145)

## 2025-06-11 PROCEDURE — 36415 COLL VENOUS BLD VENIPUNCTURE: CPT

## 2025-06-11 PROCEDURE — 99999 PR PBB SHADOW E&M-EST. PATIENT-LVL III: CPT | Mod: PBBFAC,,, | Performed by: MIDWIFE

## 2025-06-11 PROCEDURE — 80053 COMPREHEN METABOLIC PANEL: CPT

## 2025-06-11 PROCEDURE — 99213 OFFICE O/P EST LOW 20 MIN: CPT | Mod: PBBFAC,TH | Performed by: MIDWIFE

## 2025-06-11 PROCEDURE — 82239 BILE ACIDS TOTAL: CPT

## 2025-06-11 NOTE — PROGRESS NOTES
36 weeks gestation of pregnancy    Doing well, feels much better since being discharged from L&D for contractions. Completed abx for BV.  Denies regular contractions, VB, LOF  GBS negative 6/2/25, aware will need to be re-swabbed if >5 weeks  Has decided on getting circumcision  Encouraged to pack bags for hospital  Questions answered regarding cervical exams  31lb 8.4oz TWG  FKC's and PTL precautions  RTC in 1 week, sooner if needed    Itching  -     Comprehensive Metabolic Panel; Future; Expected date: 06/11/2025  -     Bile acids, cholylglycine; Future; Expected date: 06/11/2025     Reports itchy feet & sometimes hands. CMP and bile acids today.     Anemia during pregnancy in third trimester    PO iron supplement     Pyelectasis of fetus on prenatal ultrasound     F/u U/S next week

## 2025-06-12 ENCOUNTER — PATIENT MESSAGE (OUTPATIENT)
Dept: OBSTETRICS AND GYNECOLOGY | Facility: CLINIC | Age: 25
End: 2025-06-12
Payer: MEDICAID

## 2025-06-13 ENCOUNTER — RESULTS FOLLOW-UP (OUTPATIENT)
Dept: OBSTETRICS AND GYNECOLOGY | Facility: CLINIC | Age: 25
End: 2025-06-13

## 2025-06-13 LAB — BILE AC SERPL-SCNC: 1 MCMOL/L

## 2025-06-16 ENCOUNTER — PROCEDURE VISIT (OUTPATIENT)
Dept: OBSTETRICS AND GYNECOLOGY | Facility: CLINIC | Age: 25
End: 2025-06-16
Payer: MEDICAID

## 2025-06-16 ENCOUNTER — ROUTINE PRENATAL (OUTPATIENT)
Dept: OBSTETRICS AND GYNECOLOGY | Facility: CLINIC | Age: 25
End: 2025-06-16
Payer: MEDICAID

## 2025-06-16 VITALS
BODY MASS INDEX: 37.07 KG/M2 | SYSTOLIC BLOOD PRESSURE: 126 MMHG | DIASTOLIC BLOOD PRESSURE: 64 MMHG | WEIGHT: 196.19 LBS

## 2025-06-16 DIAGNOSIS — O36.63X0 EXCESSIVE FETAL GROWTH AFFECTING MANAGEMENT OF PREGNANCY IN THIRD TRIMESTER, SINGLE OR UNSPECIFIED FETUS: ICD-10-CM

## 2025-06-16 DIAGNOSIS — O35.EXX0 PYELECTASIS OF FETUS ON PRENATAL ULTRASOUND: ICD-10-CM

## 2025-06-16 DIAGNOSIS — Z3A.36 36 WEEKS GESTATION OF PREGNANCY: Primary | ICD-10-CM

## 2025-06-16 DIAGNOSIS — O99.013 ANEMIA DURING PREGNANCY IN THIRD TRIMESTER: ICD-10-CM

## 2025-06-16 PROCEDURE — 76816 OB US FOLLOW-UP PER FETUS: CPT | Mod: PBBFAC | Performed by: OBSTETRICS & GYNECOLOGY

## 2025-06-16 PROCEDURE — 99212 OFFICE O/P EST SF 10 MIN: CPT | Mod: PBBFAC,TH | Performed by: MIDWIFE

## 2025-06-16 PROCEDURE — 99999 PR PBB SHADOW E&M-EST. PATIENT-LVL II: CPT | Mod: PBBFAC,,, | Performed by: MIDWIFE

## 2025-06-16 NOTE — PROGRESS NOTES
36 weeks gestation of pregnancy    Doing well, good fetal movement  Denies contractions, VB, LOF  Encouraged to choose pedi prior to delivery  U/S today: VTX, posterior placenta, MVP 4.9cm, growth 90% and the AC plotting at the >99%tile. The AC plots beyond 3 weeks ahead of EGA which is likely to be an indicator of evolving macrosomia. EFW 8lb 3oz, 3,720 gm. Reviewed U/S with mom and FOB and risks of LGA. Will get EFW in 3 weeks.   35lb 11.4oz TWG  Kick counts and PTL precautions  RTC in 1 week, sooner if needed     Excessive fetal growth affecting management of pregnancy in third trimester, single or unspecified fetus  -     US OB/GYN Procedure ([facility])-Future; Future    EFW in 3 weeks prior to delivery     Anemia during pregnancy in third trimester    Iron supplement     Pyelectasis of fetus on prenatal ultrasound     U/S today: Pyelectasis remains visualized in the left kidney 9.1mm. Right kidney normal. F/u with peds PP.

## 2025-06-18 ENCOUNTER — PATIENT MESSAGE (OUTPATIENT)
Dept: OBSTETRICS AND GYNECOLOGY | Facility: CLINIC | Age: 25
End: 2025-06-18
Payer: MEDICAID

## 2025-06-26 ENCOUNTER — RESULTS FOLLOW-UP (OUTPATIENT)
Dept: OBSTETRICS AND GYNECOLOGY | Facility: CLINIC | Age: 25
End: 2025-06-26

## 2025-06-26 ENCOUNTER — ROUTINE PRENATAL (OUTPATIENT)
Dept: OBSTETRICS AND GYNECOLOGY | Facility: CLINIC | Age: 25
End: 2025-06-26
Payer: MEDICAID

## 2025-06-26 VITALS — DIASTOLIC BLOOD PRESSURE: 70 MMHG | SYSTOLIC BLOOD PRESSURE: 108 MMHG | WEIGHT: 201.5 LBS | BODY MASS INDEX: 38.07 KG/M2

## 2025-06-26 DIAGNOSIS — Z3A.38 38 WEEKS GESTATION OF PREGNANCY: Primary | ICD-10-CM

## 2025-06-26 DIAGNOSIS — O35.EXX0 PYELECTASIS OF FETUS ON PRENATAL ULTRASOUND: ICD-10-CM

## 2025-06-26 DIAGNOSIS — R82.90 ABNORMAL URINE ODOR: ICD-10-CM

## 2025-06-26 DIAGNOSIS — O99.013 ANEMIA DURING PREGNANCY IN THIRD TRIMESTER: ICD-10-CM

## 2025-06-26 LAB
BILIRUB UR QL STRIP.AUTO: NEGATIVE
CLARITY UR: CLEAR
COLOR UR AUTO: YELLOW
GLUCOSE UR QL STRIP: NEGATIVE
HGB UR QL STRIP: NEGATIVE
KETONES UR QL STRIP: NEGATIVE
LEUKOCYTE ESTERASE UR QL STRIP: NEGATIVE
NITRITE UR QL STRIP: NEGATIVE
PH UR STRIP: 8 [PH]
PROT UR QL STRIP: ABNORMAL
SP GR UR STRIP: 1.02

## 2025-06-26 PROCEDURE — 81003 URINALYSIS AUTO W/O SCOPE: CPT | Performed by: MIDWIFE

## 2025-06-26 PROCEDURE — 87210 SMEAR WET MOUNT SALINE/INK: CPT | Mod: PBBFAC | Performed by: MIDWIFE

## 2025-06-26 PROCEDURE — 99213 OFFICE O/P EST LOW 20 MIN: CPT | Mod: PBBFAC,TH | Performed by: MIDWIFE

## 2025-06-26 PROCEDURE — 99999PBSHW PR PBB SHADOW TECHNICAL ONLY FILED TO HB: Mod: PBBFAC,,,

## 2025-06-26 PROCEDURE — 99999 PR PBB SHADOW E&M-EST. PATIENT-LVL III: CPT | Mod: PBBFAC,,, | Performed by: MIDWIFE

## 2025-06-26 NOTE — PROGRESS NOTES
38 weeks gestation of pregnancy    Overall doing OK, ready for baby  Good fetal movement  Having more frequent contractions. No labor pattern. No VB. Has been feeling more damp and having to change underwear more, concerned leaking fluid.   Patient asking about induction due to LGA baby. Discussed not an indication for macrosomia. Discussed possibility of eIOL once cervix more favorable and > 39 weeks. Wants membrane weep next week if able.   Speculum exam performed. There is normal leukorrhea in the vagina. No pooling and nitrazine negative. Wet prep negative. Cervical exam per request. Cervix 0/60/-2. Chaperone present for exam.   41lb 0.1oz TWG, FH 40  Kick counts and SROM/labor precautions  RTC in 1 week, sooner if needed     Anemia during pregnancy in third trimester    Iron supplement     Pyelectasis of fetus on prenatal ultrasound    F/u with peds PP    Abnormal urine odor  -     Urinalysis, Reflex to Urine Culture Urine, Clean Catch  -     GREY TOP URINE HOLD

## 2025-06-27 LAB — HOLD SPECIMEN: NORMAL

## 2025-06-28 ENCOUNTER — HOSPITAL ENCOUNTER (INPATIENT)
Facility: HOSPITAL | Age: 25
LOS: 1 days | Discharge: HOME | DRG: 833 | End: 2025-06-28
Attending: EMERGENCY MEDICINE | Admitting: STUDENT IN AN ORGANIZED HEALTH CARE EDUCATION/TRAINING PROGRAM
Payer: COMMERCIAL

## 2025-06-28 VITALS
OXYGEN SATURATION: 100 % | WEIGHT: 201 LBS | BODY MASS INDEX: 37.95 KG/M2 | RESPIRATION RATE: 20 BRPM | DIASTOLIC BLOOD PRESSURE: 71 MMHG | TEMPERATURE: 99 F | HEIGHT: 61 IN | SYSTOLIC BLOOD PRESSURE: 122 MMHG | HEART RATE: 97 BPM

## 2025-06-28 DIAGNOSIS — R10.9 ABDOMINAL PAIN: ICD-10-CM

## 2025-06-28 PROBLEM — R10.2 PELVIC PAIN AFFECTING PREGNANCY IN SECOND TRIMESTER, ANTEPARTUM: Status: RESOLVED | Noted: 2025-03-31 | Resolved: 2025-06-28

## 2025-06-28 PROBLEM — O26.892 PELVIC PAIN AFFECTING PREGNANCY IN SECOND TRIMESTER, ANTEPARTUM: Status: RESOLVED | Noted: 2025-03-31 | Resolved: 2025-06-28
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
## 2025-06-28 PROCEDURE — 63600175 PHARM REV CODE 636 W HCPCS: Performed by: MIDWIFE

## 2025-06-28 PROCEDURE — 99211 OFF/OP EST MAY X REQ PHY/QHP: CPT

## 2025-06-28 PROCEDURE — 4A1HXCZ MONITORING OF PRODUCTS OF CONCEPTION, CARDIAC RATE, EXTERNAL APPROACH: ICD-10-PCS | Performed by: STUDENT IN AN ORGANIZED HEALTH CARE EDUCATION/TRAINING PROGRAM

## 2025-06-28 PROCEDURE — 11000001 HC ACUTE MED/SURG PRIVATE ROOM

## 2025-06-28 PROCEDURE — 25000003 PHARM REV CODE 250: Performed by: MIDWIFE

## 2025-06-28 PROCEDURE — 99285 EMERGENCY DEPT VISIT HI MDM: CPT | Mod: 25,27

## 2025-06-28 PROCEDURE — 59025 FETAL NON-STRESS TEST: CPT

## 2025-06-28 RX ORDER — CYCLOBENZAPRINE HCL 10 MG
10 TABLET ORAL ONCE
Status: COMPLETED | OUTPATIENT
Start: 2025-06-28 | End: 2025-06-28

## 2025-06-28 RX ORDER — ACETAMINOPHEN 500 MG
1000 TABLET ORAL ONCE
Status: COMPLETED | OUTPATIENT
Start: 2025-06-28 | End: 2025-06-28

## 2025-06-28 RX ORDER — SODIUM CHLORIDE, SODIUM LACTATE, POTASSIUM CHLORIDE, CALCIUM CHLORIDE 600; 310; 30; 20 MG/100ML; MG/100ML; MG/100ML; MG/100ML
INJECTION, SOLUTION INTRAVENOUS CONTINUOUS
Status: DISCONTINUED | OUTPATIENT
Start: 2025-06-28 | End: 2025-06-29 | Stop reason: HOSPADM

## 2025-06-28 RX ORDER — ONDANSETRON 8 MG/1
8 TABLET, ORALLY DISINTEGRATING ORAL EVERY 8 HOURS PRN
Status: DISCONTINUED | OUTPATIENT
Start: 2025-06-28 | End: 2025-06-29 | Stop reason: HOSPADM

## 2025-06-28 RX ADMIN — CYCLOBENZAPRINE HYDROCHLORIDE 10 MG: 10 TABLET, FILM COATED ORAL at 05:06

## 2025-06-28 RX ADMIN — ACETAMINOPHEN 1000 MG: 500 TABLET ORAL at 05:06

## 2025-06-28 RX ADMIN — SODIUM CHLORIDE, POTASSIUM CHLORIDE, SODIUM LACTATE AND CALCIUM CHLORIDE: 600; 310; 30; 20 INJECTION, SOLUTION INTRAVENOUS at 05:06
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
## 2025-06-28 NOTE — HOSPITAL COURSE
Observe x 6 hours  CEFM/TOCO  US-BP 8/8, posterior placenta, vertex  Flexeril  Tylenol  D/C home after 6 hours
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
## 2025-06-29 NOTE — PROCEDURES
"Janelle Clemente is a 24 y.o. female patient.    Temp: 98.8 °F (37.1 °C) (06/28/25 1640)  Pulse: 106 (06/28/25 1642)  Resp: 20 (06/28/25 1640)  BP: 129/79 (06/28/25 1642)  SpO2: 99 % (06/28/25 1619)  Weight: 91.2 kg (201 lb) (06/28/25 1640)  Height: 5' 1" (154.9 cm) (06/28/25 1640)       Obtain Fetal nonstress test (NST)    Date/Time: 6/28/2025 7:45 PM    Performed by: Corbin Chirinos CNM  Authorized by: Corbin Chirinos CNM    Nonstress Test:     Variability:  6-25 BPM    Decelerations:  None    Accelerations:  15 bpm    Uterine Irritability: No      Contractions:  Irregular  Biophysical Profile:     Nonstress Test Interpretation: reactive      Overall Impression:  Reassuring  Post-procedure:     Patient tolerance:  Patient tolerated the procedure well with no immediate complications      6/28/2025    "
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
## 2025-06-30 ENCOUNTER — ANESTHESIA (OUTPATIENT)
Dept: OBSTETRICS AND GYNECOLOGY | Facility: HOSPITAL | Age: 25
End: 2025-06-30
Payer: MEDICAID

## 2025-06-30 ENCOUNTER — ANESTHESIA EVENT (OUTPATIENT)
Dept: OBSTETRICS AND GYNECOLOGY | Facility: HOSPITAL | Age: 25
End: 2025-06-30
Payer: MEDICAID

## 2025-06-30 ENCOUNTER — HOSPITAL ENCOUNTER (INPATIENT)
Facility: HOSPITAL | Age: 25
LOS: 3 days | Discharge: HOME | End: 2025-07-03
Attending: STUDENT IN AN ORGANIZED HEALTH CARE EDUCATION/TRAINING PROGRAM | Admitting: STUDENT IN AN ORGANIZED HEALTH CARE EDUCATION/TRAINING PROGRAM
Payer: MEDICAID

## 2025-06-30 DIAGNOSIS — Z37.9 NORMAL LABOR: ICD-10-CM

## 2025-06-30 PROBLEM — O47.9 THREATENED LABOR AT TERM: Status: RESOLVED | Noted: 2025-06-30 | Resolved: 2025-06-30

## 2025-06-30 PROBLEM — O36.63X0 FETAL MACROSOMIA DURING PREGNANCY IN THIRD TRIMESTER: Status: ACTIVE | Noted: 2025-06-30

## 2025-06-30 PROBLEM — O47.9 THREATENED LABOR AT TERM: Status: ACTIVE | Noted: 2025-06-30

## 2025-06-30 LAB
ABSOLUTE EOSINOPHIL (OHS): 0.01 K/UL
ABSOLUTE MONOCYTE (OHS): 0.79 K/UL (ref 0.3–1)
ABSOLUTE NEUTROPHIL COUNT (OHS): 9.62 K/UL (ref 1.8–7.7)
BASOPHILS # BLD AUTO: 0.04 K/UL
BASOPHILS NFR BLD AUTO: 0.3 %
ERYTHROCYTE [DISTWIDTH] IN BLOOD BY AUTOMATED COUNT: 14.9 % (ref 11.5–14.5)
GROUP & RH: NORMAL
HCT VFR BLD AUTO: 27.9 % (ref 37–48.5)
HGB BLD-MCNC: 8.4 GM/DL (ref 12–16)
HIV 1+2 AB+HIV1 P24 AG SERPL QL IA: NEGATIVE
IMM GRANULOCYTES # BLD AUTO: 0.05 K/UL (ref 0–0.04)
IMM GRANULOCYTES NFR BLD AUTO: 0.4 % (ref 0–0.5)
INDIRECT COOMBS: NORMAL
LYMPHOCYTES # BLD AUTO: 1.37 K/UL (ref 1–4.8)
MCH RBC QN AUTO: 23.4 PG (ref 27–31)
MCHC RBC AUTO-ENTMCNC: 30.1 G/DL (ref 32–36)
MCV RBC AUTO: 78 FL (ref 82–98)
NUCLEATED RBC (/100WBC) (OHS): 0 /100 WBC
PLATELET # BLD AUTO: 281 K/UL (ref 150–450)
PMV BLD AUTO: 10.6 FL (ref 9.2–12.9)
RBC # BLD AUTO: 3.59 M/UL (ref 4–5.4)
RELATIVE EOSINOPHIL (OHS): 0.1 %
RELATIVE LYMPHOCYTE (OHS): 11.5 % (ref 18–48)
RELATIVE MONOCYTE (OHS): 6.6 % (ref 4–15)
RELATIVE NEUTROPHIL (OHS): 81.1 % (ref 38–73)
WBC # BLD AUTO: 11.88 K/UL (ref 3.9–12.7)

## 2025-06-30 PROCEDURE — 86593 SYPHILIS TEST NON-TREP QUANT: CPT | Performed by: ADVANCED PRACTICE MIDWIFE

## 2025-06-30 PROCEDURE — 25000003 PHARM REV CODE 250: Performed by: NURSE ANESTHETIST, CERTIFIED REGISTERED

## 2025-06-30 PROCEDURE — 11000001 HC ACUTE MED/SURG PRIVATE ROOM

## 2025-06-30 PROCEDURE — 85025 COMPLETE CBC W/AUTO DIFF WBC: CPT | Performed by: ADVANCED PRACTICE MIDWIFE

## 2025-06-30 PROCEDURE — 62326 NJX INTERLAMINAR LMBR/SAC: CPT | Performed by: NURSE ANESTHETIST, CERTIFIED REGISTERED

## 2025-06-30 PROCEDURE — 72100002 HC LABOR CARE, 1ST 8 HOURS

## 2025-06-30 PROCEDURE — 63600175 PHARM REV CODE 636 W HCPCS: Performed by: ADVANCED PRACTICE MIDWIFE

## 2025-06-30 PROCEDURE — 63600175 PHARM REV CODE 636 W HCPCS: Performed by: ANESTHESIOLOGY

## 2025-06-30 PROCEDURE — 27200710 HC EPIDURAL INFUSION PUMP SET: Performed by: ANESTHESIOLOGY

## 2025-06-30 PROCEDURE — 86850 RBC ANTIBODY SCREEN: CPT | Performed by: ADVANCED PRACTICE MIDWIFE

## 2025-06-30 PROCEDURE — 51702 INSERT TEMP BLADDER CATH: CPT

## 2025-06-30 PROCEDURE — 27800516 HC TRAY, EPIDURAL COMBO: Performed by: ANESTHESIOLOGY

## 2025-06-30 PROCEDURE — 87389 HIV-1 AG W/HIV-1&-2 AB AG IA: CPT | Performed by: ADVANCED PRACTICE MIDWIFE

## 2025-06-30 RX ORDER — EPHEDRINE SULFATE 50 MG/ML
10 INJECTION, SOLUTION INTRAVENOUS ONCE AS NEEDED
Status: DISCONTINUED | OUTPATIENT
Start: 2025-06-30 | End: 2025-07-01

## 2025-06-30 RX ORDER — SODIUM CHLORIDE, SODIUM LACTATE, POTASSIUM CHLORIDE, CALCIUM CHLORIDE 600; 310; 30; 20 MG/100ML; MG/100ML; MG/100ML; MG/100ML
INJECTION, SOLUTION INTRAVENOUS CONTINUOUS
Status: DISCONTINUED | OUTPATIENT
Start: 2025-06-30 | End: 2025-07-01

## 2025-06-30 RX ORDER — SODIUM CHLORIDE 9 MG/ML
INJECTION, SOLUTION INTRAVENOUS
Status: DISCONTINUED | OUTPATIENT
Start: 2025-06-30 | End: 2025-07-01

## 2025-06-30 RX ORDER — ONDANSETRON 8 MG/1
8 TABLET, ORALLY DISINTEGRATING ORAL EVERY 8 HOURS PRN
Status: DISCONTINUED | OUTPATIENT
Start: 2025-06-30 | End: 2025-07-01

## 2025-06-30 RX ORDER — DEXMEDETOMIDINE HYDROCHLORIDE 100 UG/ML
INJECTION, SOLUTION INTRAVENOUS
Status: DISCONTINUED | OUTPATIENT
Start: 2025-06-30 | End: 2025-07-01

## 2025-06-30 RX ORDER — ROPIVACAINE HYDROCHLORIDE 2 MG/ML
12 INJECTION, SOLUTION EPIDURAL; INFILTRATION CONTINUOUS
Status: DISCONTINUED | OUTPATIENT
Start: 2025-06-30 | End: 2025-07-01

## 2025-06-30 RX ORDER — CALCIUM CARBONATE 200(500)MG
500 TABLET,CHEWABLE ORAL 3 TIMES DAILY PRN
Status: DISCONTINUED | OUTPATIENT
Start: 2025-06-30 | End: 2025-07-01

## 2025-06-30 RX ORDER — SIMETHICONE 80 MG
1 TABLET,CHEWABLE ORAL 4 TIMES DAILY PRN
Status: DISCONTINUED | OUTPATIENT
Start: 2025-06-30 | End: 2025-07-01

## 2025-06-30 RX ADMIN — SODIUM CHLORIDE, POTASSIUM CHLORIDE, SODIUM LACTATE AND CALCIUM CHLORIDE: 600; 310; 30; 20 INJECTION, SOLUTION INTRAVENOUS at 09:06

## 2025-06-30 RX ADMIN — DEXMEDETOMIDINE 10 MCG: 200 INJECTION, SOLUTION INTRAVENOUS at 09:06

## 2025-06-30 RX ADMIN — ROPIVACAINE HYDROCHLORIDE 5 ML: 2 INJECTION, SOLUTION EPIDURAL; INFILTRATION at 09:06

## 2025-06-30 RX ADMIN — SODIUM CHLORIDE, POTASSIUM CHLORIDE, SODIUM LACTATE AND CALCIUM CHLORIDE 1000 ML: 600; 310; 30; 20 INJECTION, SOLUTION INTRAVENOUS at 09:06

## 2025-07-01 PROBLEM — Z34.93 NORMAL PREGNANCY IN THIRD TRIMESTER: Status: RESOLVED | Noted: 2024-12-16 | Resolved: 2025-07-01

## 2025-07-01 LAB
INDIRECT COOMBS: NORMAL
RH BLD: NORMAL
SPECIMEN OUTDATE: NORMAL
T PALLIDUM IGG+IGM SER QL: NORMAL

## 2025-07-01 PROCEDURE — 25000003 PHARM REV CODE 250: Performed by: ADVANCED PRACTICE MIDWIFE

## 2025-07-01 PROCEDURE — 36415 COLL VENOUS BLD VENIPUNCTURE: CPT | Performed by: ADVANCED PRACTICE MIDWIFE

## 2025-07-01 PROCEDURE — 72100003 HC LABOR CARE, EA. ADDL. 8 HRS

## 2025-07-01 PROCEDURE — 51701 INSERT BLADDER CATHETER: CPT

## 2025-07-01 PROCEDURE — 11000001 HC ACUTE MED/SURG PRIVATE ROOM

## 2025-07-01 PROCEDURE — 59409 OBSTETRICAL CARE: CPT | Mod: AT,,, | Performed by: ADVANCED PRACTICE MIDWIFE

## 2025-07-01 PROCEDURE — 72200004 HC VAGINAL DELIVERY LEVEL I

## 2025-07-01 PROCEDURE — 63600175 PHARM REV CODE 636 W HCPCS: Performed by: ADVANCED PRACTICE MIDWIFE

## 2025-07-01 PROCEDURE — 86850 RBC ANTIBODY SCREEN: CPT | Performed by: ADVANCED PRACTICE MIDWIFE

## 2025-07-01 RX ORDER — METHYLERGONOVINE MALEATE 0.2 MG/ML
200 INJECTION INTRAVENOUS ONCE AS NEEDED
Status: DISCONTINUED | OUTPATIENT
Start: 2025-07-01 | End: 2025-07-03 | Stop reason: HOSPADM

## 2025-07-01 RX ORDER — PRENATAL WITH FERROUS FUM AND FOLIC ACID 3080; 920; 120; 400; 22; 1.84; 3; 20; 10; 1; 12; 200; 27; 25; 2 [IU]/1; [IU]/1; MG/1; [IU]/1; MG/1; MG/1; MG/1; MG/1; MG/1; MG/1; UG/1; MG/1; MG/1; MG/1; MG/1
1 TABLET ORAL DAILY
Status: DISCONTINUED | OUTPATIENT
Start: 2025-07-01 | End: 2025-07-03 | Stop reason: HOSPADM

## 2025-07-01 RX ORDER — CARBOPROST TROMETHAMINE 250 UG/ML
250 INJECTION, SOLUTION INTRAMUSCULAR
Status: DISCONTINUED | OUTPATIENT
Start: 2025-07-01 | End: 2025-07-03 | Stop reason: HOSPADM

## 2025-07-01 RX ORDER — TRANEXAMIC ACID 10 MG/ML
1000 INJECTION, SOLUTION INTRAVENOUS EVERY 30 MIN PRN
Status: DISCONTINUED | OUTPATIENT
Start: 2025-07-01 | End: 2025-07-01

## 2025-07-01 RX ORDER — MISOPROSTOL 200 UG/1
800 TABLET ORAL ONCE AS NEEDED
Status: DISCONTINUED | OUTPATIENT
Start: 2025-07-01 | End: 2025-07-03 | Stop reason: HOSPADM

## 2025-07-01 RX ORDER — LANOLIN ALCOHOL/MO/W.PET/CERES
1 CREAM (GRAM) TOPICAL DAILY
Status: DISCONTINUED | OUTPATIENT
Start: 2025-07-01 | End: 2025-07-03 | Stop reason: HOSPADM

## 2025-07-01 RX ORDER — METHYLERGONOVINE MALEATE 0.2 MG/ML
200 INJECTION INTRAVENOUS ONCE AS NEEDED
Status: DISCONTINUED | OUTPATIENT
Start: 2025-07-01 | End: 2025-07-01

## 2025-07-01 RX ORDER — OXYTOCIN-SODIUM CHLORIDE 0.9% IV SOLN 30 UNIT/500ML 30-0.9/5 UT/ML-%
95 SOLUTION INTRAVENOUS CONTINUOUS PRN
Status: DISCONTINUED | OUTPATIENT
Start: 2025-07-01 | End: 2025-07-01

## 2025-07-01 RX ORDER — OXYTOCIN-SODIUM CHLORIDE 0.9% IV SOLN 30 UNIT/500ML 30-0.9/5 UT/ML-%
10 SOLUTION INTRAVENOUS ONCE AS NEEDED
Status: COMPLETED | OUTPATIENT
Start: 2025-07-01 | End: 2025-07-01

## 2025-07-01 RX ORDER — OXYTOCIN 10 [USP'U]/ML
10 INJECTION, SOLUTION INTRAMUSCULAR; INTRAVENOUS ONCE AS NEEDED
Status: DISCONTINUED | OUTPATIENT
Start: 2025-07-01 | End: 2025-07-01

## 2025-07-01 RX ORDER — OXYTOCIN-SODIUM CHLORIDE 0.9% IV SOLN 30 UNIT/500ML 30-0.9/5 UT/ML-%
95 SOLUTION INTRAVENOUS CONTINUOUS PRN
Status: DISCONTINUED | OUTPATIENT
Start: 2025-07-01 | End: 2025-07-03 | Stop reason: HOSPADM

## 2025-07-01 RX ORDER — OXYTOCIN-SODIUM CHLORIDE 0.9% IV SOLN 30 UNIT/500ML 30-0.9/5 UT/ML-%
10 SOLUTION INTRAVENOUS ONCE AS NEEDED
Status: DISCONTINUED | OUTPATIENT
Start: 2025-07-01 | End: 2025-07-01

## 2025-07-01 RX ORDER — CARBOPROST TROMETHAMINE 250 UG/ML
250 INJECTION, SOLUTION INTRAMUSCULAR
Status: DISCONTINUED | OUTPATIENT
Start: 2025-07-01 | End: 2025-07-01

## 2025-07-01 RX ORDER — IBUPROFEN 600 MG/1
600 TABLET, FILM COATED ORAL EVERY 6 HOURS
Status: DISCONTINUED | OUTPATIENT
Start: 2025-07-01 | End: 2025-07-03 | Stop reason: HOSPADM

## 2025-07-01 RX ORDER — ONDANSETRON 8 MG/1
8 TABLET, ORALLY DISINTEGRATING ORAL EVERY 8 HOURS PRN
Status: DISCONTINUED | OUTPATIENT
Start: 2025-07-01 | End: 2025-07-03 | Stop reason: HOSPADM

## 2025-07-01 RX ORDER — HYDROCODONE BITARTRATE AND ACETAMINOPHEN 5; 325 MG/1; MG/1
1 TABLET ORAL EVERY 4 HOURS PRN
Status: DISCONTINUED | OUTPATIENT
Start: 2025-07-01 | End: 2025-07-03 | Stop reason: HOSPADM

## 2025-07-01 RX ORDER — ACETAMINOPHEN 325 MG/1
650 TABLET ORAL EVERY 6 HOURS PRN
Status: DISCONTINUED | OUTPATIENT
Start: 2025-07-01 | End: 2025-07-03 | Stop reason: HOSPADM

## 2025-07-01 RX ORDER — DIPHENHYDRAMINE HCL 25 MG
25 CAPSULE ORAL EVERY 4 HOURS PRN
Status: DISCONTINUED | OUTPATIENT
Start: 2025-07-01 | End: 2025-07-03 | Stop reason: HOSPADM

## 2025-07-01 RX ORDER — DIPHENHYDRAMINE HYDROCHLORIDE 50 MG/ML
25 INJECTION, SOLUTION INTRAMUSCULAR; INTRAVENOUS EVERY 4 HOURS PRN
Status: DISCONTINUED | OUTPATIENT
Start: 2025-07-01 | End: 2025-07-03 | Stop reason: HOSPADM

## 2025-07-01 RX ORDER — DIPHENOXYLATE HYDROCHLORIDE AND ATROPINE SULFATE 2.5; .025 MG/1; MG/1
2 TABLET ORAL EVERY 6 HOURS PRN
Status: DISCONTINUED | OUTPATIENT
Start: 2025-07-01 | End: 2025-07-03 | Stop reason: HOSPADM

## 2025-07-01 RX ORDER — SIMETHICONE 80 MG
1 TABLET,CHEWABLE ORAL EVERY 6 HOURS PRN
Status: DISCONTINUED | OUTPATIENT
Start: 2025-07-01 | End: 2025-07-03 | Stop reason: HOSPADM

## 2025-07-01 RX ORDER — OXYTOCIN-SODIUM CHLORIDE 0.9% IV SOLN 30 UNIT/500ML 30-0.9/5 UT/ML-%
95 SOLUTION INTRAVENOUS ONCE AS NEEDED
Status: DISCONTINUED | OUTPATIENT
Start: 2025-07-01 | End: 2025-07-01

## 2025-07-01 RX ORDER — DIPHENOXYLATE HYDROCHLORIDE AND ATROPINE SULFATE 2.5; .025 MG/1; MG/1
2 TABLET ORAL EVERY 6 HOURS PRN
Status: DISCONTINUED | OUTPATIENT
Start: 2025-07-01 | End: 2025-07-01

## 2025-07-01 RX ORDER — OXYTOCIN 10 [USP'U]/ML
10 INJECTION, SOLUTION INTRAMUSCULAR; INTRAVENOUS ONCE AS NEEDED
Status: DISCONTINUED | OUTPATIENT
Start: 2025-07-01 | End: 2025-07-03 | Stop reason: HOSPADM

## 2025-07-01 RX ORDER — OXYTOCIN-SODIUM CHLORIDE 0.9% IV SOLN 30 UNIT/500ML 30-0.9/5 UT/ML-%
30 SOLUTION INTRAVENOUS ONCE AS NEEDED
Status: DISCONTINUED | OUTPATIENT
Start: 2025-07-01 | End: 2025-07-03 | Stop reason: HOSPADM

## 2025-07-01 RX ORDER — SODIUM CHLORIDE 0.9 % (FLUSH) 0.9 %
10 SYRINGE (ML) INJECTION
Status: DISCONTINUED | OUTPATIENT
Start: 2025-07-01 | End: 2025-07-03 | Stop reason: HOSPADM

## 2025-07-01 RX ORDER — TRANEXAMIC ACID 10 MG/ML
1000 INJECTION, SOLUTION INTRAVENOUS EVERY 30 MIN PRN
Status: DISCONTINUED | OUTPATIENT
Start: 2025-07-01 | End: 2025-07-03 | Stop reason: HOSPADM

## 2025-07-01 RX ORDER — LIDOCAINE HYDROCHLORIDE 10 MG/ML
10 INJECTION, SOLUTION EPIDURAL; INFILTRATION; INTRACAUDAL; PERINEURAL ONCE AS NEEDED
Status: DISCONTINUED | OUTPATIENT
Start: 2025-07-01 | End: 2025-07-01

## 2025-07-01 RX ORDER — MISOPROSTOL 200 UG/1
800 TABLET ORAL ONCE AS NEEDED
Status: DISCONTINUED | OUTPATIENT
Start: 2025-07-01 | End: 2025-07-01

## 2025-07-01 RX ORDER — DOCUSATE SODIUM 100 MG/1
200 CAPSULE, LIQUID FILLED ORAL 2 TIMES DAILY PRN
Status: DISCONTINUED | OUTPATIENT
Start: 2025-07-01 | End: 2025-07-03 | Stop reason: HOSPADM

## 2025-07-01 RX ORDER — HYDROCORTISONE 25 MG/G
CREAM TOPICAL 3 TIMES DAILY PRN
Status: DISCONTINUED | OUTPATIENT
Start: 2025-07-01 | End: 2025-07-03 | Stop reason: HOSPADM

## 2025-07-01 RX ORDER — OXYTOCIN-SODIUM CHLORIDE 0.9% IV SOLN 30 UNIT/500ML 30-0.9/5 UT/ML-%
95 SOLUTION INTRAVENOUS ONCE AS NEEDED
Status: DISCONTINUED | OUTPATIENT
Start: 2025-07-01 | End: 2025-07-03 | Stop reason: HOSPADM

## 2025-07-01 RX ADMIN — OXYTOCIN-SODIUM CHLORIDE 0.9% IV SOLN 30 UNIT/500ML 10 UNITS: 30-0.9/5 SOLUTION at 07:07

## 2025-07-01 RX ADMIN — SIMETHICONE 80 MG: 80 TABLET, CHEWABLE ORAL at 10:07

## 2025-07-01 RX ADMIN — FERROUS SULFATE TAB 325 MG (65 MG ELEMENTAL FE) 1 EACH: 325 (65 FE) TAB at 10:07

## 2025-07-01 RX ADMIN — Medication 95 MILLI-UNITS/MIN: at 07:07

## 2025-07-01 RX ADMIN — IBUPROFEN 600 MG: 600 TABLET ORAL at 10:07

## 2025-07-01 RX ADMIN — HYDROCODONE BITARTRATE AND ACETAMINOPHEN 1 TABLET: 5; 325 TABLET ORAL at 02:07

## 2025-07-01 RX ADMIN — PRENATAL VITAMINS-IRON FUMARATE 27 MG IRON-FOLIC ACID 0.8 MG TABLET 1 TABLET: at 10:07

## 2025-07-01 RX ADMIN — ACETAMINOPHEN 650 MG: 325 TABLET ORAL at 08:07

## 2025-07-01 RX ADMIN — IBUPROFEN 600 MG: 600 TABLET ORAL at 11:07

## 2025-07-01 RX ADMIN — PRAMOXINE HYDROCHLORIDE HYDROCORTISONE ACETATE: 100; 100 AEROSOL, FOAM TOPICAL at 09:07

## 2025-07-01 RX ADMIN — IBUPROFEN 600 MG: 600 TABLET ORAL at 05:07

## 2025-07-01 RX ADMIN — PRAMOXINE HYDROCHLORIDE HYDROCORTISONE ACETATE: 100; 100 AEROSOL, FOAM TOPICAL at 10:07

## 2025-07-01 NOTE — HOSPITAL COURSE
Admit for labor management  Epidural when desires  7/2/25- PPD1 Routine postpartum orders     7/3/25 PPD 2 normal PP course, d/c home today
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
## 2025-07-01 NOTE — H&P
[facility]  Obstetrics  History & Physical    Patient Name: Janelle Clemente  MRN: [id number]  Admission Date: 2025  Primary Care Provider: Dasia Mijares MD    Subjective:     Principal Problem:Normal labor    History of Present Illness:  Presents with C/O contractions    Obstetric HPI:  Patient reports  contractions, active fetal movement, No vaginal bleeding , No loss of fluid     This pregnancy has been complicated by ptyalectasis, anemia, suspected macrasomia    OB History    Para Term  AB Living   2 0 0 0 1 0   SAB IAB Ectopic Multiple Live Births   1 0 0 0 0      # Outcome Date GA Lbr Eloy/2nd Weight Sex Type Anes PTL Lv   2 Current            1 SAB 2024 7w0d            No past medical history on file.  Past Surgical History:   Procedure Laterality Date    DILATION AND CURETTAGE OF UTERUS      2024       PTA Medications   Medication Sig    acetaminophen (TYLENOL) 500 MG tablet Take 500 mg by mouth every 6 (six) hours as needed.    aspirin (ECOTRIN) 81 MG EC tablet Take 1 tablet (81 mg total) by mouth once daily.    famotidine (PEPCID) 20 MG tablet Take 20 mg by mouth.    ferrous sulfate 325 (65 FE) MG EC tablet Take 1 tablet (325 mg total) by mouth 2 (two) times daily.    prenatal 25/iron/folate 6/dha (PRENA1 ORAL) Take by mouth.       Review of patient's allergies indicates:  No Known Allergies     Family History    None       Tobacco Use    Smoking status: Never     Passive exposure: Never    Smokeless tobacco: Never   Substance and Sexual Activity    Alcohol use: Not Currently    Drug use: Never    Sexual activity: Yes     Partners: Male     Review of Systems   Gastrointestinal:  Positive for abdominal pain.   All other systems reviewed and are negative.     Objective:     Vital Signs (Most Recent):    Vital Signs (24h Range):  Pulse:  [] 98  Resp:  [20] 20  SpO2:  [96 %-100 %] 100 %  BP: ()/(63-90) 116/87        There is no height or weight on file  to calculate BMI.    FHT: applied  TOCO:  Applied     Physical Exam:   Constitutional: She is oriented to person, place, and time. She appears well-developed and well-nourished.       Cardiovascular:  Normal rate.             Pulmonary/Chest: Effort normal.        Abdominal: Soft.     Genitourinary:    Vagina and uterus normal.             Musculoskeletal: Normal range of motion and moves all extremeties.       Neurological: She is alert and oriented to person, place, and time.    Skin: Skin is warm and dry.    Psychiatric: She has a normal mood and affect. Her behavior is normal. Judgment and thought content normal.        Cervix:  5-6 per RN     Significant Labs:  Lab Results   Component Value Date    GROUPTRH A POS 2024    HEPBSAG Non-reactive 2024       I have personallly reviewed all pertinent lab results from the last 24 hours.  Assessment/Plan:     24 y.o. female  at 38w5d for:    * Normal labor  Admit for labor management  Epidural when desires    Anemia during pregnancy in third trimester  Iron PO PP    Pyelectasis of fetus on prenatal ultrasound  Notify Peds after delivery        Linda García CNM  Obstetrics  [facility]

## 2025-07-01 NOTE — PLAN OF CARE
[facility])  Discharge Assessment    Primary Care Provider: Dasia Mijares MD     OB Screen (most recent)       OB Screen - [id number]          OB SCREEN    Assessment Type Discharge Planning Assessment     Source of Information patient;health record     Received Prenatal Care Yes     Any indications/suspicions for None     Is this a teen pregnancy No     Is the baby in NICU No     Indication for adoption/[facility] No     Indication for DME/post-acute needs No     HIV (+) No     Any congenital  disorders No     Fetal demise/ death No

## 2025-07-01 NOTE — SUBJECTIVE & OBJECTIVE
Obstetric HPI:  Patient reports  contractions, active fetal movement, No vaginal bleeding , No loss of fluid     This pregnancy has been complicated by ptyalectasis, anemia, suspected macrasomia    OB History    Para Term  AB Living   2 0 0 0 1 0   SAB IAB Ectopic Multiple Live Births   1 0 0 0 0      # Outcome Date GA Lbr Eloy/2nd Weight Sex Type Anes PTL Lv   2 Current            1 SAB 2024 7w0d            No past medical history on file.  Past Surgical History:   Procedure Laterality Date    DILATION AND CURETTAGE OF UTERUS      2024       PTA Medications   Medication Sig    acetaminophen (TYLENOL) 500 MG tablet Take 500 mg by mouth every 6 (six) hours as needed.    aspirin (ECOTRIN) 81 MG EC tablet Take 1 tablet (81 mg total) by mouth once daily.    famotidine (PEPCID) 20 MG tablet Take 20 mg by mouth.    ferrous sulfate 325 (65 FE) MG EC tablet Take 1 tablet (325 mg total) by mouth 2 (two) times daily.    prenatal 25/iron/folate 6/dha (PRENA1 ORAL) Take by mouth.       Review of patient's allergies indicates:  No Known Allergies     Family History    None       Tobacco Use    Smoking status: Never     Passive exposure: Never    Smokeless tobacco: Never   Substance and Sexual Activity    Alcohol use: Not Currently    Drug use: Never    Sexual activity: Yes     Partners: Male     Review of Systems   Gastrointestinal:  Positive for abdominal pain.   All other systems reviewed and are negative.     Objective:     Vital Signs (Most Recent):    Vital Signs (24h Range):  Pulse:  [] 98  Resp:  [20] 20  SpO2:  [96 %-100 %] 100 %  BP: ()/(63-90) 116/87        There is no height or weight on file to calculate BMI.    FHT: applied  TOCO:  Applied     Physical Exam:   Constitutional: She is oriented to person, place, and time. She appears well-developed and well-nourished.       Cardiovascular:  Normal rate.             Pulmonary/Chest: Effort normal.        Abdominal: Soft.     Genitourinary:     Vagina and uterus normal.             Musculoskeletal: Normal range of motion and moves all extremeties.       Neurological: She is alert and oriented to person, place, and time.    Skin: Skin is warm and dry.    Psychiatric: She has a normal mood and affect. Her behavior is normal. Judgment and thought content normal.        Cervix:  5-6 per RN     Significant Labs:  Lab Results   Component Value Date    GROUPTRH A POS 11/07/2024    HEPBSAG Non-reactive 11/07/2024       I have personallly reviewed all pertinent lab results from the last 24 hours.

## 2025-07-01 NOTE — ANESTHESIA PREPROCEDURE EVALUATION
06/30/2025  Janelle Clemente is a 24 y.o., female.      Pre-op Assessment    I have reviewed the Patient Summary Reports.     I have reviewed the Nursing Notes. I have reviewed the NPO Status.   I have reviewed the Medications.     Review of Systems  Social:  Non-Smoker, No Alcohol Use       Hematology/Oncology:    Oncology Normal    -- Anemia:                                  EENT/Dental:  EENT/Dental Normal           Cardiovascular:  Cardiovascular Normal                                              Pulmonary:  Pulmonary Normal                       Renal/:  Renal/ Normal                 Hepatic/GI:  Hepatic/GI Normal                    Musculoskeletal:  Musculoskeletal Normal                Neurological:    Neuromuscular Disease,  Headaches      Dx of Headaches                         Neuromuscular Disease   Endocrine:  Endocrine Normal            Dermatological:  Skin Normal    Psych:  Psychiatric Normal                    Physical Exam  General: Well nourished, Cooperative, Alert and Oriented    Airway:  Mallampati: I   Mouth Opening: Normal  TM Distance: Normal  Tongue: Normal  Neck ROM: Normal ROM    Dental:  Intact        Anesthesia Plan  Type of Anesthesia, risks & benefits discussed:    Anesthesia Type: Epidural  Informed Consent: Informed consent signed with the Patient and all parties understand the risks and agree with anesthesia plan.  All questions answered. Patient consented to blood products? Yes  ASA Score: 2    Ready For Surgery From Anesthesia Perspective.     .

## 2025-07-01 NOTE — LACTATION NOTE
Lactation rounds- Mother resting in bed recovering from vaginal delivery. Mother appears uncomfortable and in pain. Mother states she's having cramping and perineum pressure but mother is agreeable to trying to latch infant. Infant is likely LGA and will require glucose monitoring.     Baby is showing feeding cues. Helped mother to settle in a FB position on the R breast. Reviewed deep asymmetric latch and proper positioning. Mother is unable to participate in latching due to pain and medical lines hooked up. Deep latch easily obtained. Nutritive sucks and audible swallows noted, and mother denies pain or discomfort at breast. Baby feed ongoing.     Lactation packet reviewed for days 1-2.  Discussed early feeding cues and encouraged mother to feed baby in response to those cues. Encouraged on demand feedings and skin to skin.  Reviewed normal feeding expectations of 8 or more feedings per 24 hour period, cues that babies use to signal hunger and satiety and cluster feeding. Discussed the adequacy of colostrum and baby belly size for the first 3 days of life along with expected output.     Mother states she received an electric wall breast pump through insurance but also purchased a [employer] and a manual breast pump. Discussed potential risks of using a wireless breast pump as a primary pump. Discussed wireless breast pumps having lower suction and thus decreased stimulation and emptying which can potentially lead to decreased milk supply over time. Suggested to mother to only use wireless breastpump as a relief pump and to use the wall pump as her primary pump to maintain her supply. Instructed mother to do a pumping session with each pumps and to compare output between pumps. Instructed mother if she does use the wireless pump as her primary pump and she notices a drop in her milk volume she should return back to wall pump.     Mother states understanding and verbalized appropriate recall. Encouraged mother to call  for assistance when desired or when infant is showing signs of hunger, contact number provided, mother verbalizes understanding.    Transition nurse Caroline, justice.

## 2025-07-01 NOTE — PROGRESS NOTES
Pt reports relief of pressure after passing flatulence. Pt eager to get up to the bathroom. Pt assisted to restroom, gait steady. While on toilet, patient became pale, diaphorectic, and lightheaded. Alexandra Hernandez used to bring patient back to bed. Pt provided with a cool rag and placed back on bp cuff and pulse oximetry.

## 2025-07-01 NOTE — PLAN OF CARE
Pt progressing well. Up ad franco and voiding without difficulty. Bleeding is light. Pain controlled with po meds. Vitals stable. Bonding with baby. Breastfeeding.

## 2025-07-01 NOTE — ANESTHESIA POSTPROCEDURE EVALUATION
Anesthesia Post Evaluation    Patient: Janelle Clemente    Procedure(s) Performed: * No procedures listed *    Final Anesthesia Type: epidural      Patient location during evaluation: labor & delivery  Patient participation: Yes- Able to Participate  Level of consciousness: awake and alert and oriented  Post-procedure vital signs: reviewed and stable  Pain management: adequate  Airway patency: patent    PONV status at discharge: No PONV  Anesthetic complications: no      Cardiovascular status: blood pressure returned to baseline, stable and hemodynamically stable  Respiratory status: unassisted, room air and spontaneous ventilation  Hydration status: euvolemic  Follow-up not needed.              Vitals Value Taken Time   BP 99/59 07/01/25 13:07   Temp 37.4 °C (99.3 °F) 07/01/25 09:45   Pulse 75 07/01/25 13:07   Resp 18 07/01/25 05:51   SpO2 100 % 07/01/25 13:06   Vitals shown include unfiled device data.      No case tracking events are documented in the log.      Pain/Gil Score: Pain Rating Prior to Med Admin: 3 (7/1/2025 10:49 AM)  Pain Rating Post Med Admin: 5 (7/1/2025 10:00 AM)  Gil Score: 10 (7/1/2025 10:00 AM)

## 2025-07-01 NOTE — LACTATION NOTE
Lactation rounds- Mother called for latch assistance due to sleepy infant.     Mother called to report infant sleepy and difficulty waking for feeds. Upon entering room, room is very dark and infant is fully dressed and swaddled. Instructed mother that normally I would recommend undressing infant down to diaper but due to last temperature on infant being slightly low we will keep him dressed but unswaddle him. Lights turned on and infant unwrapped. Infant began to stir and show feeding cues.     Mother is still having significant hemorrhoid/perineum discomfort so she is only comfortable laying flat in a tilt with one leg bent up. Assisted mother in properly positioning infant in football position, instructed mother to push infant far back so that breast is at his face level or slightly higher and not at his chest level. Also instructed mother to turn infant belly to belly. Deep latch easily achieved, nutritive suck and frequent audible swallows noted. Infant is noted to be making popping noise prior to each swallow indicating that infant is losing the oral seal and tongue is coming off the breast. Instructed mother to monitor for this and hopefully as infant gets more organized and relaxed from delivery this popping will cease. Instructed mother to notify lactation tomorrow if this continues after infant passes 24 hrs. Mother denies pain with popping sound.     Mother verbalizes understanding of all education and counseling. Mother denies any further lactation needs or concerns at this time. Discussed lactation availability. Encouraged mother to call for assistance when needs arise.    Primary nurse Radha updated on latch and popping sound prior to each swallow.
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
## 2025-07-01 NOTE — ANESTHESIA PROCEDURE NOTES
Epidural    Patient location during procedure: OB   Reason for block: primary anesthetic   Reason for block: labor analgesia requested by patient and obstetrician  Diagnosis: IUP Labor   Start time: 6/30/2025 9:07 PM  Timeout: 6/30/2025 9:07 PM  End time: 6/30/2025 9:12 PM    Staffing  Performing Provider: Milad Moe CRNA  Authorizing Provider: Brooks Palma MD    Staffing  Performed by: Milad Moe CRNA  Authorized by: Brooks Palma MD        Preanesthetic Checklist  Completed: patient identified, IV checked, risks and benefits discussed, monitors and equipment checked, pre-op evaluation, timeout performed, anesthesia consent given, hand hygiene performed and patient being monitored  Preparation  Patient position: sitting  Prep: ChloraPrep  Patient monitoring: Pulse Ox and Blood Pressure  Reason for block: primary anesthetic   Epidural  Skin Anesthetic: lidocaine 1%  Skin Wheal: 2 mL  Administration type: continuous  Approach: midline  Interspace: L3-4    Injection technique: DANNY air  Needle and Epidural Catheter  Needle type: Tuohy   Needle gauge: 17  Needle length: 3.5 inches  Needle insertion depth: 6 cm  Catheter type: springwound and multi-orifice  Catheter size: 19 G  Catheter at skin depth: 11 cm  Insertion Attempts: 1  Test dose: 3 mL of lidocaine 1.5% with Epi 1-to-200,000  Additional Documentation: negative aspiration for heme and CSF, incremental injection, no paresthesia on injection, no signs/symptoms of IV or SA injection, no significant complaints from patient and no significant pain on injection  Needle localization: anatomical landmarks  Assessment  Ease of block: easy  Patient's tolerance of the procedure: comfortable throughout block and no complaints No inadvertent dural puncture with Tuohy.  Dural puncture not performed with spinal needle

## 2025-07-02 PROCEDURE — 99231 SBSQ HOSP IP/OBS SF/LOW 25: CPT | Mod: TH,,, | Performed by: MIDWIFE

## 2025-07-02 PROCEDURE — 11000001 HC ACUTE MED/SURG PRIVATE ROOM

## 2025-07-02 PROCEDURE — 25000003 PHARM REV CODE 250: Performed by: ADVANCED PRACTICE MIDWIFE

## 2025-07-02 RX ADMIN — SIMETHICONE 80 MG: 80 TABLET, CHEWABLE ORAL at 07:07

## 2025-07-02 RX ADMIN — IBUPROFEN 600 MG: 600 TABLET ORAL at 05:07

## 2025-07-02 RX ADMIN — FERROUS SULFATE TAB 325 MG (65 MG ELEMENTAL FE) 1 EACH: 325 (65 FE) TAB at 08:07

## 2025-07-02 RX ADMIN — SIMETHICONE 80 MG: 80 TABLET, CHEWABLE ORAL at 12:07

## 2025-07-02 RX ADMIN — HYDROCODONE BITARTRATE AND ACETAMINOPHEN 1 TABLET: 5; 325 TABLET ORAL at 05:07

## 2025-07-02 RX ADMIN — IBUPROFEN 600 MG: 600 TABLET ORAL at 11:07

## 2025-07-02 RX ADMIN — PRENATAL VITAMINS-IRON FUMARATE 27 MG IRON-FOLIC ACID 0.8 MG TABLET 1 TABLET: at 08:07

## 2025-07-02 NOTE — LACTATION NOTE
Attempted lactation rounds- mother is attempting to take a nap, infant is swaddle and asleep in bassinet. Mother states she's been practicing latching but she states she feels like infant isn't getting enough so she's been supplementing with formula. Offered to assess latch with next feed and instructed  mother to call when next feed. Mother agreeable.

## 2025-07-02 NOTE — LACTATION NOTE
This note was copied from a baby's chart.  Mother called for assistance with latching. Assisted mother in positioning infant in football position. Unable to obtain deep latch on either breast due to infant being shallow at the breast. Educated mother on expressing breast milk out for infant with pumping or hand expression. Mother agreeable to start pumping.      [employer] Symphony breast pump set up at bedside. Instructed on proper usage and to adjust suction according to comfort level. Verified appropriate flange fit. Reviewed frequency and duration of pumping in order to promote and maintain full milk supply. Hands-on pumping technique reviewed. Encouraged hand expression after. Instructed on proper cleaning of breast pump parts. Reviewed proper milk handling, collection, storage, and transportation. Voices understanding.

## 2025-07-02 NOTE — SUBJECTIVE & OBJECTIVE
Interval History: PPD1    She is doing well this morning. She is tolerating a regular diet without nausea or vomiting. She is voiding spontaneously. She is ambulating. Vaginal bleeding is mild. She denies fever or chills. Abdominal pain is mild and controlled with oral medications. She Is breastfeeding. She desires circumcision for her male baby: yes.    Objective:     Vital Signs (Most Recent):  Temp: 97.7 °F (36.5 °C) (07/02/25 0831)  Pulse: 97 (07/02/25 0831)  Resp: 17 (07/02/25 0831)  BP: 104/66 (07/02/25 0831)  SpO2: 97 % (07/02/25 0831) Vital Signs (24h Range):  Temp:  [97.3 °F (36.3 °C)-98.4 °F (36.9 °C)] 97.7 °F (36.5 °C)  Pulse:  [88-97] 97  Resp:  [16-17] 17  SpO2:  [96 %-100 %] 97 %  BP: ()/(42-79) 104/66     Weight: 91 kg (200 lb 9.9 oz)  Body mass index is 37.91 kg/m².      Intake/Output Summary (Last 24 hours) at 7/2/2025 1016  Last data filed at 7/1/2025 1930  Gross per 24 hour   Intake --   Output 800 ml   Net -800 ml         Significant Labs:  Lab Results   Component Value Date    GROUPTRH A POS 07/01/2025    HEPBSAG Non-reactive 11/07/2024     Recent Labs   Lab 06/30/25 2049   HGB 8.4*   HCT 27.9*       I have personallly reviewed all pertinent lab results from the last 24 hours.    Physical Exam:   Constitutional: She is oriented to person, place, and time. She appears well-developed and well-nourished.    HENT:   Head: Normocephalic.    Eyes: Conjunctivae are normal.      Pulmonary/Chest: Effort normal.        Abdominal: Soft.             Musculoskeletal: Normal range of motion.       Neurological: She is alert and oriented to person, place, and time.    Skin: Skin is warm and dry.    Psychiatric: She has a normal mood and affect. Her behavior is normal. Judgment and thought content normal.       Review of Systems   Musculoskeletal:  Positive for back pain.

## 2025-07-02 NOTE — LACTATION NOTE
Lactation rounds- Mother called back and states infant  ready to nurse at this time. Mother had called at 1300 as well but  infant was grunting and bearing down and instructed  mother to hold infant  in a froggy legged position  on her chest to  facilitate stooling and to call back once infant was no longer trying to stool Mother called back and states that infant is no longer bearing down and she thinks it was just gas.     Baby is showing feeding cues. Helped mother to settle in a football position on the R breast. Reviewed deep asymmetric latch and proper positioning. Mother is initially placing the nipple at the infant's mouth opening and allowing him to latch on her nipple alone. Mother does have prominent nipples and instructed mother that she has to pull the infant fully on to her breast to assure adequate stimulation and emptying. Mother properly demonstrated unlatching technique breaking the oral seal prior to pulling infant off the breast. Nipple is significantly compressed, flat like a pancake. Instructed  mother that her nipple  ideally would look round coming out like it did going in. Mother is able to demonstrate back proper latch and deep latch easily obtained. Nutritive sucks and frequent Audible swallows noted, and mother denies pain or discomfort. Baby feed ongoing. Instructed mother to continue to monitor degree of nipple compression with each unlatching and to notify staff  should nipple develop any cracks, bleeding, or other damage.     Mother verbalizes understanding of all education and counseling. Mother denies any further lactation needs or concerns at this time. Discussed lactation availability. Informed mother of  overnight lactation availability tonight. Encouraged mother to call for assistance when needs arise.     Primary nurse, Linsey, updated on  latching progress.

## 2025-07-02 NOTE — H&P
[facility] ([facility])  Obstetrics  Progress Note    Patient Name: Janelle Clemente  MRN: [id number]  Admission Date: 2025  Primary Care Provider: Dasia Mijares MD    Subjective:     Principal Problem: (normal spontaneous vaginal delivery)    History of Present Illness:  Presents with C/O contractions    Interval History: PPD1    She is doing well this morning. She is tolerating a regular diet without nausea or vomiting. She is voiding spontaneously. She is ambulating. Vaginal bleeding is mild. She denies fever or chills. Abdominal pain is mild and controlled with oral medications. She Is breastfeeding. She desires circumcision for her male baby: yes.    Objective:     Vital Signs (Most Recent):  Temp: 97.7 °F (36.5 °C) (25)  Pulse: 97 (25)  Resp: 17 (25)  BP: 104/66 (25)  SpO2: 97 % (25) Vital Signs (24h Range):  Temp:  [97.3 °F (36.3 °C)-98.4 °F (36.9 °C)] 97.7 °F (36.5 °C)  Pulse:  [88-97] 97  Resp:  [16-17] 17  SpO2:  [96 %-100 %] 97 %  BP: ()/(42-79) 104/66     Weight: 91 kg (200 lb 9.9 oz)  Body mass index is 37.91 kg/m².      Intake/Output Summary (Last 24 hours) at 2025 1016  Last data filed at 2025 1930  Gross per 24 hour   Intake --   Output 800 ml   Net -800 ml         Significant Labs:  Lab Results   Component Value Date    GROUPTRH A POS 2025    HEPBSAG Non-reactive 2024     Recent Labs   Lab 25   HGB 8.4*   HCT 27.9*       I have personallly reviewed all pertinent lab results from the last 24 hours.    Physical Exam:   Constitutional: She is oriented to person, place, and time. She appears well-developed and well-nourished.    HENT:   Head: Normocephalic.    Eyes: Conjunctivae are normal.      Pulmonary/Chest: Effort normal.        Abdominal: Soft.             Musculoskeletal: Normal range of motion.       Neurological: She is alert and oriented to person, place, and time.    Skin:  Skin is warm and dry.    Psychiatric: She has a normal mood and affect. Her behavior is normal. Judgment and thought content normal.       Review of Systems   Musculoskeletal:  Positive for back pain.     Assessment/Plan:     24 y.o. female  at 38w6d for:    *  (normal spontaneous vaginal delivery)  Routine postpartum orders     First degree perineal laceration during delivery  Routine pericare    Single liveborn infant delivered vaginally  Viable male infant in the care of peds     Anemia during pregnancy in third trimester  Iron PO PP     Pyelectasis of fetus on prenatal ultrasound  Notify Peds after delivery         Oly Altamirano CNM  Obstetrics  [facility] ([facility])

## 2025-07-03 ENCOUNTER — PATIENT MESSAGE (OUTPATIENT)
Dept: OBSTETRICS AND GYNECOLOGY | Facility: CLINIC | Age: 25
End: 2025-07-03
Payer: MEDICAID

## 2025-07-03 VITALS
TEMPERATURE: 98 F | OXYGEN SATURATION: 99 % | BODY MASS INDEX: 37.88 KG/M2 | HEART RATE: 92 BPM | SYSTOLIC BLOOD PRESSURE: 120 MMHG | HEIGHT: 61 IN | DIASTOLIC BLOOD PRESSURE: 76 MMHG | RESPIRATION RATE: 16 BRPM | WEIGHT: 200.63 LBS

## 2025-07-03 PROBLEM — O36.63X0 FETAL MACROSOMIA DURING PREGNANCY IN THIRD TRIMESTER: Status: RESOLVED | Noted: 2025-06-30 | Resolved: 2025-07-03

## 2025-07-03 PROBLEM — O35.EXX0 PYELECTASIS OF FETUS ON PRENATAL ULTRASOUND: Status: RESOLVED | Noted: 2025-02-26 | Resolved: 2025-07-03
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
## 2025-07-03 PROBLEM — O23.599 BACTERIAL VAGINOSIS IN PREGNANCY: Status: RESOLVED | Noted: 2025-06-01 | Resolved: 2025-07-03

## 2025-07-03 PROBLEM — B96.89 BACTERIAL VAGINOSIS IN PREGNANCY: Status: RESOLVED | Noted: 2025-06-01 | Resolved: 2025-07-03

## 2025-07-03 PROCEDURE — 25000003 PHARM REV CODE 250: Performed by: ADVANCED PRACTICE MIDWIFE

## 2025-07-03 PROCEDURE — 99238 HOSP IP/OBS DSCHRG MGMT 30/<: CPT | Mod: ,,, | Performed by: MIDWIFE

## 2025-07-03 RX ORDER — IBUPROFEN 600 MG/1
600 TABLET, FILM COATED ORAL EVERY 6 HOURS PRN
Qty: 30 TABLET | Refills: 0 | Status: SHIPPED | OUTPATIENT
Start: 2025-07-03

## 2025-07-03 RX ORDER — HYDROCODONE BITARTRATE AND ACETAMINOPHEN 5; 325 MG/1; MG/1
1 TABLET ORAL EVERY 8 HOURS PRN
Qty: 5 TABLET | Refills: 0 | Status: SHIPPED | OUTPATIENT
Start: 2025-07-03

## 2025-07-03 RX ADMIN — IBUPROFEN 600 MG: 600 TABLET ORAL at 11:07

## 2025-07-03 RX ADMIN — FERROUS SULFATE TAB 325 MG (65 MG ELEMENTAL FE) 1 EACH: 325 (65 FE) TAB at 08:07

## 2025-07-03 RX ADMIN — IBUPROFEN 600 MG: 600 TABLET ORAL at 12:07

## 2025-07-03 RX ADMIN — IBUPROFEN 600 MG: 600 TABLET ORAL at 06:07

## 2025-07-03 RX ADMIN — PRENATAL VITAMINS-IRON FUMARATE 27 MG IRON-FOLIC ACID 0.8 MG TABLET 1 TABLET: at 08:07

## 2025-07-03 NOTE — PROGRESS NOTES
"[facility] ([facility])  Obstetrics  Postpartum Progress Note    Patient Name: Janelle Clemente  MRN: [id number]  Admission Date: 2025  Hospital Length of Stay: 3 days  Attending Physician: Chantale Mcclure MD  Primary Care Provider: Dasia Mijares MD    Subjective:     Principal Problem: (normal spontaneous vaginal delivery)    Hospital Course:  Admit for labor management  Epidural when desires  25- PPD1 Routine postpartum orders     7/3/25 PPD 2 normal PP course, d/c home today    Interval History: PPD 2    She is doing well this morning. She is tolerating a regular diet without nausea or vomiting. She is voiding spontaneously. She is ambulating. She has passed flatus, and has not a BM. Vaginal bleeding is mild. She denies fever or chills. Abdominal pain is mild and controlled with oral medications. She Is breastfeeding. She desires circumcision for her male baby: yes.    Objective:     Vital Signs (Most Recent):  Temp: 97.9 °F (36.6 °C) (25 0412)  Pulse: 93 (25 0412)  Resp: 17 (25 0412)  BP: 112/64 (25 0412)  SpO2: 97 % (25 041) Vital Signs (24h Range):  Temp:  [97.5 °F (36.4 °C)-98.1 °F (36.7 °C)] 97.9 °F (36.6 °C)  Pulse:  [] 93  Resp:  [16-17] 17  SpO2:  [96 %-99 %] 97 %  BP: (104-126)/(62-77) 112/64     Weight: 91 kg (200 lb 9.9 oz)  Body mass index is 37.91 kg/m².    No intake or output data in the 24 hours ending 25 0737      Significant Labs:  Lab Results   Component Value Date    GROUPTRH A POS 2025    HEPBSAG Non-reactive 2024     No results for input(s): "HGB", "HCT" in the last 48 hours.    I have personallly reviewed all pertinent lab results from the last 24 hours.  Recent Lab Results       None            Physical Exam:   Constitutional: She is oriented to person, place, and time. She appears well-developed and well-nourished.    HENT:   Head: Normocephalic.      Cardiovascular:  Normal rate.             Pulmonary/Chest: " Effort normal.        Abdominal: Soft.     Genitourinary: There is vaginal discharge in the vagina.    Genitourinary Comments:  lochia small/mod per pt             Musculoskeletal: Normal range of motion and moves all extremeties.       Neurological: She is alert and oriented to person, place, and time.    Skin: Skin is warm and dry.    Psychiatric: She has a normal mood and affect. Her behavior is normal. Judgment and thought content normal.       Review of Systems   Eyes:  Negative for visual disturbance.   Gastrointestinal:  Positive for abdominal pain.   Genitourinary:  Positive for vaginal bleeding.   Neurological:  Negative for headaches.   All other systems reviewed and are negative.    Assessment/Plan:     24 y.o. female  for:    *  (normal spontaneous vaginal delivery)  Routine postpartum orders     First degree perineal laceration during delivery  Routine pericare    Single liveborn infant delivered vaginally  Viable male infant in the care of peds     Anemia during pregnancy in third trimester  Iron PO PP         Disposition: As patient meets milestones, will plan to discharge today.    Corbin Chirinos CNM  Obstetrics  [facility] ([facility])

## 2025-07-03 NOTE — LACTATION NOTE
Lactation Rounds:   Mother reports that she had been breastfeeding and supplementing with formula, but has now decided to exclusively pump and provide expressed breast milk (EBM) and formula, as this was her original plan for home. Mother denies any pain with pumping and reports no nipple redness, blisters, or damage. She denies nipple pain while pumping.     Infant has been primarily fed formula through a bottle. Infant is consuming up to 2 oz of formula per feeding and is tolerating it well.     Discussed methods to promote and maintain milk production. Discussed and recommended that mother breastfeed infant at least 8 times within a 24-hour period prior to supplementation. Since baby is being supplemented away from the breast, mother was informed that breastfeeding support is available and encouraged to express milk from both breasts each time a supplement is given. Encouraged mother to use her own collected milk as the first choice for supplementation. Discussed benefits of paced bottle-feeding technique, mother effectively demonstration back.     Discussed proper use of breast pump, hands-on pumping and hand expression after. Reviewed proper hand and pumping kit hygiene with mother. Reviewed proper handling of breast milk, labeling and storage. Encouraged to seek assistance as needed, mother verbalized understanding.

## 2025-07-03 NOTE — SUBJECTIVE & OBJECTIVE
"Interval History: PPD 2    She is doing well this morning. She is tolerating a regular diet without nausea or vomiting. She is voiding spontaneously. She is ambulating. She has passed flatus, and has not a BM. Vaginal bleeding is mild. She denies fever or chills. Abdominal pain is mild and controlled with oral medications. She Is breastfeeding. She desires circumcision for her male baby: yes.    Objective:     Vital Signs (Most Recent):  Temp: 97.9 °F (36.6 °C) (07/03/25 0412)  Pulse: 93 (07/03/25 0412)  Resp: 17 (07/03/25 0412)  BP: 112/64 (07/03/25 0412)  SpO2: 97 % (07/03/25 0412) Vital Signs (24h Range):  Temp:  [97.5 °F (36.4 °C)-98.1 °F (36.7 °C)] 97.9 °F (36.6 °C)  Pulse:  [] 93  Resp:  [16-17] 17  SpO2:  [96 %-99 %] 97 %  BP: (104-126)/(62-77) 112/64     Weight: 91 kg (200 lb 9.9 oz)  Body mass index is 37.91 kg/m².    No intake or output data in the 24 hours ending 07/03/25 0737      Significant Labs:  Lab Results   Component Value Date    GROUPH A POS 07/01/2025    HEPBSAG Non-reactive 11/07/2024     No results for input(s): "HGB", "HCT" in the last 48 hours.    I have personallly reviewed all pertinent lab results from the last 24 hours.  Recent Lab Results       None            Physical Exam:   Constitutional: She is oriented to person, place, and time. She appears well-developed and well-nourished.    HENT:   Head: Normocephalic.      Cardiovascular:  Normal rate.             Pulmonary/Chest: Effort normal.        Abdominal: Soft.     Genitourinary: There is vaginal discharge in the vagina.    Genitourinary Comments:  lochia small/mod per pt             Musculoskeletal: Normal range of motion and moves all extremeties.       Neurological: She is alert and oriented to person, place, and time.    Skin: Skin is warm and dry.    Psychiatric: She has a normal mood and affect. Her behavior is normal. Judgment and thought content normal.       Review of Systems   Eyes:  Negative for visual disturbance. "   Gastrointestinal:  Positive for abdominal pain.   Genitourinary:  Positive for vaginal bleeding.   Neurological:  Negative for headaches.   All other systems reviewed and are negative.

## 2025-07-03 NOTE — DISCHARGE SUMMARY
"[facility] ([facility])  Obstetrics  Discharge Summary      Patient Name: Janelle Clemente  MRN: [id number]  Admission Date: 2025  Hospital Length of Stay: 3 days  Discharge Date and Time: No discharge date for patient encounter.  Attending Physician: Chantale Mcclure MD   Discharging Provider: Corbin Chirinos CNM   Primary Care Provider: Dasia Mijares MD    HPI: Presents with C/O contractions        * No surgery found *     Hospital Course:   Admit for labor management  Epidural when desires  25- PPD1 Routine postpartum orders     7/3/25 PPD 2 normal PP course, d/c home today         Final Active Diagnoses:    Diagnosis Date Noted POA    PRINCIPAL PROBLEM:   (normal spontaneous vaginal delivery) [O80] 2025 Not Applicable    Single liveborn infant delivered vaginally [Z38.00] 2025 No    First degree perineal laceration during delivery [O70.0] 2025 No    Anemia during pregnancy in third trimester [O99.013] 2025 Yes      Problems Resolved During this Admission:    Diagnosis Date Noted Date Resolved POA    Pyelectasis of fetus on prenatal ultrasound [O35.EXX0] 2025 Yes        Significant Diagnostic Studies: Labs: All labs within the past 24 hours have been reviewed      Feeding Method: breast    Immunizations       Date Immunization Status Dose Route/Site Given by    25 0936 MMR Incomplete 0.5 mL Subcutaneous/     25 0936 Tdap Incomplete 0.5 mL Intramuscular/             Delivery:    Episiotomy: None   Lacerations: 1st   Repair suture: None   Repair # of packets: 2   Blood loss (ml):       Birth information:  YOB: 2025   Time of birth: 7:47 AM   Sex: male   Delivery type: Vaginal, Spontaneous   Gestational Age: 38w6d     Measurements    Weight: 4260 g  Weight (lbs): 9 lb 6.3 oz  Length: 54 cm  Length (in): 21.26"  Head circumference: 36.5 cm  Chest circumference: 37.5 cm  Abdominal girth: 34.5 cm         Delivery Clinician: " Delivery Providers    Delivering clinician: Rohith Sabillon CNM   Provider Role    Caroline Hood RN Registered Nurse    Mary Duque RN Registered Nurse    Garrett Oswald [facility]    Constantine Rand Nursing Student             Additional  information:  Forceps:    Vacuum:    Breech:    Observed anomalies      Living?:     Apgars    Living status: Living  Apgar Component Scores:  1 min.:  5 min.:  10 min.:  15 min.:  20 min.:    Skin color:  0  1       Heart rate:  2  2       Reflex irritability:  2  2       Muscle tone:  2  2       Respiratory effort:  2  2       Total:  8  9       Apgars assigned by: ASHLY HOOD RN         Placenta: Delivered:       appearance  Pending Diagnostic Studies:       None            Discharged Condition: stable    Disposition: Home or Self Care    Follow Up:   Follow-up Information       Lana Delgado CNM Follow up in 4 week(s).    Specialty: Obstetrics and Gynecology  Why: PP f/u  Contact information:  [id number]  [facility] [id number] [phone]                           Patient Instructions:      Diet Adult Regular     No driving until:   Order Comments: 1-2 wks     Pelvic Rest     Notify your health care provider if you experience any of the following:  temperature >100.4     Notify your health care provider if you experience any of the following:  severe uncontrolled pain     Notify your health care provider if you experience any of the following:  redness, tenderness, or signs of infection (pain, swelling, redness, odor or green/yellow discharge around incision site)     Notify your health care provider if you experience any of the following:  severe persistent headache     Notify your health care provider if you experience any of the following:  persistent dizziness, light-headedness, or visual disturbances     Medications:  Current Discharge Medication List        START taking these medications    Details   HYDROcodone-acetaminophen (NORCO)  5-325 mg per tablet Take 1 tablet by mouth every 8 (eight) hours as needed for Pain.  Qty: 5 tablet, Refills: 0    Comments: Quantity prescribed more than 7 day supply? No  Associated Diagnoses:  (normal spontaneous vaginal delivery)      ibuprofen (ADVIL,MOTRIN) 600 MG tablet Take 1 tablet (600 mg total) by mouth every 6 (six) hours as needed for Pain.  Qty: 30 tablet, Refills: 0           CONTINUE these medications which have NOT CHANGED    Details   acetaminophen (TYLENOL) 500 MG tablet Take 500 mg by mouth every 6 (six) hours as needed.      ferrous sulfate 325 (65 FE) MG EC tablet Take 1 tablet (325 mg total) by mouth 2 (two) times daily.  Qty: 60 tablet, Refills: 5    Associated Diagnoses: Anemia during pregnancy in third trimester      prenatal 25/iron/folate 6/dha (PRENA1 ORAL) Take by mouth.           STOP taking these medications       aspirin (ECOTRIN) 81 MG EC tablet Comments:   Reason for Stopping:         famotidine (PEPCID) 20 MG tablet Comments:   Reason for Stopping:               Corbin Chirinos CNM  Obstetrics  [facility] ([facility])

## 2025-07-03 NOTE — DISCHARGE INSTRUCTIONS
"Mother Self Care:    Activity: Avoid strenuous exercise and get adequate rest.  No driving until the physician consent given.  Emotional Changes: Most women find birth to be a time of great emotional upheaval.  Sense of loss, mood swings, fatigue, anxiety, and feeling "let down" are common.  If feelings worsen or last more than a week, call your physician.  Breast Care/Breastfeeding: Wear a bra for comfort.  Keep nipples dry and apply your own breast milk or lanolin cream as needed for soreness.  Engorgement can be relieved with warm, moist heat before feedings.  You may also take Ibuprofen.  Breast Care/Bottle Feeding: Wear support bra 24 hours a day for one week.  Avoid stimulation to breasts.  You may use ice packs for discomfort.  Abdirahman-Care/Vaginal Bleeding: Remember to use your abdirahman-bottle after urinating.  Your flow will change from red, to pink, to yellow/white color over a period of 2 weeks.  Menstruation will return in 3-8 weeks, or longer if breastfeeding.   Vaginal Delivery: Stitches will dissolve within 10 days to 3 weeks.  Warm baths, tucks, and dermoplast will promote healing.  Avoid bubble baths or strong soaps.    Sexual Activity/Pelvic Rest: No sexual activity, tampons, or douching until your physician gives you consent.  Diet: Continue to eat from the five basic food groups, including plenty of protein, fruits, vegetables, and whole grains.  Limit empty calories and high fat foods.  Drink enough fluids to satisfy thirst and add an extra 500 calories for breastfeeding.  Constipation/Hemorrhoids: Drink plenty of water.  You may take a stool softener or natural laxative (Metamucil). You may use tucks or hemorrhoid ointment and soak in a warm tub.    "What does help look like to you when you go home?"  "Is there any need that you anticipate that we may be able to assist with?"    CALL YOUR OB DOCTOR IF ANY OF THE FOLLOWING OCCURS:  *Heavy bleeding - saturating a pad an hour or passing any large (2-3 " inches in size) blood clots.  *Any pain, redness, or tenderness in lower leg.  *You cannot care for yourself or your baby.  *Any signs of infection-      - Temperature greater than 100.5 degrees F      - Foul smelling vaginal discharge and/or incisional drainage      - Increased episiotomy or incisional pain      - Hot, hard, red or sore area on breast      - Flu-like symptoms      - Any urgency, frequency or burning with urination    Return To the Hospital for further Evaluation:  Headache not relieved by tylenol or ibuprofen  Blurry vision, double vision, seeing spots, or flashing lights  Feeling faint or passing out  Right epigastric pain  Difficulty breathing  Swelling in hands, face, or feet  Any of these symptoms accompanied by nausea/vomiting  Gaining more than 5 pounds in one week  Seizures  These symptoms could be an indication of elevated blood pressure.     For patients that were treated for high blood pressure during pregnancy and or your hospital stay you will need a blood pressure check three days after you leave the hospital. Your nursing staff will assist you in an appointment if needed. If you have Connected Mom you may use your blood pressure cuff and report any readings 140/90 to your provider immediately.       If you have any questions that need to be answered immediately please call the Labor & Delivery Unit at [phone] and ask to speak to a nurse.      Please see [facility] BLUE folder for additional information and handouts.

## 2025-07-03 NOTE — PLAN OF CARE
Patient afebrile and had no falls this shift. Fundus firm without massage and below umbilicus. Bleeding light, no clots passed this shift. Voids spontaneously. Ambulates independently. Pain well controlled with oral pain medication. Vital signs stable at this time. Bonding well with infant; responds to infant cues and participates in infant care. Will continue to monitor.      Problem: Adult Inpatient Plan of Care  Goal: Plan of Care Review  Outcome: Progressing  Goal: Patient-Specific Goal (Individualized)  Outcome: Progressing  Goal: Absence of Hospital-Acquired Illness or Injury  Outcome: Progressing  Goal: Optimal Comfort and Wellbeing  Outcome: Progressing  Goal: Readiness for Transition of Care  Outcome: Progressing     Problem:  Fall Injury Risk  Goal: Absence of Fall, Infant Drop and Related Injury  Outcome: Progressing     Problem: Infection  Goal: Absence of Infection Signs and Symptoms  Outcome: Progressing     Problem: Skin Injury Risk Increased  Goal: Skin Health and Integrity  Outcome: Progressing     Problem: Breastfeeding  Goal: Effective Breastfeeding  Outcome: Progressing     Problem: Postpartum (Vaginal Delivery)  Goal: Successful Parent Role Transition  Outcome: Progressing  Goal: Hemostasis  Outcome: Progressing  Goal: Absence of Infection Signs and Symptoms  Outcome: Progressing  Goal: Anesthesia/Sedation Recovery  Outcome: Progressing  Goal: Optimal Pain Control and Function  Outcome: Progressing  Goal: Effective Urinary Elimination  Outcome: Progressing

## 2025-07-03 NOTE — LACTATION NOTE
Lactation Rounds:   Infants weight loss wnl. Infants intake and output wnl. Infant has been formula feeding and eating well. Mother pumped x1 the past 24 hours.     Mother is undecided on whether to continue to pump or stop and formula feed only. Discussed with mother benefits of providing breast milk and the potential risks associated with formula feeding. Reviewed possible consequences of not offering breast milk to the infant. Encouraged mother to make an informed feeding decision.     Mother anticipates discharge home today. Reviewed signs of good attachment. Reviewed breast massage and compression during feedings and indications for use. Reviewed signs of effective milk transfer and instructed to call pediatrician and lactation if signs not present.     Signs of good a good latch:    -  Infant is calm and sucking contentedly    -  Infants mouth covering a large part of the areola     -  Chin indents breast     -  Lips flanged or turned outward    -  Wide mouth corner angle    -  Tongue is over lower gum    -  Rhythmic sucking and audible swallow    -  Rounded cheek and no dimpling    -  Infant maintains latch     -  The latch is comfortable and pain-free        Discussed expected feeding and output pattern for days of life 2, 3, 4, & 5+; mother instructed to call pediatrician and lactation if infant is not meeting feeding and output goals. Expected oral intake per feeding (according to American Academy of Breastfeeding Medicine) & expected output for each day of life:    Day 2: 5-15 mL per feeding, 2 voids, 2 stools    Day 3: 15-30 mL per feeding, 3 voids, 3 stools    Day 4: 30-60 mL per feeding, 4 voids, 3 stools    After the 4th day and your milk is in:    a. Babys stool should turn bright yellow and be loose, watery, and seedy    b. Baby should have at least 3-4 poops the size of the palm of your hand per day    c. Baby should have at least 5-6 wet diapers per day    d. Babys urine should be light yellow  in color        Discussed that formula supplementation would be medically necessary when infant is not getting the expected daily intake and output requirement.        Reviewed signs of engorgement and expectant management. Reviewed signs of mastitis and instructed mother to call OB provider and lactation if any signs present. Reviewed proper milk handling, collection, and storage guidelines. Reviewed nursing diet and nutrition. Discussed resources for medication safety while breastfeeding. Reviewed available outpatient lactation resources.        Discussed proper use of First Alert (Breastfeeding Questionnaire) Form. This is a loose form inside your Guide to Postpartum & [place] Care. These questions should be reviewed once your baby is 5 days old and any time afterward. This questionnaire helps you to determine how well you and the baby are breastfeeding and may alert you to any real or potential breastfeeding problems. All answers should land in column A. If any answers are in column B, please call the Lactation Warmline for assistance at [phone].         BREASTFEEDING QUESTIONNAIRE     These questions should be reviewed once the baby is 5 days old and any time afterward.    This questionnaire helps you to determine how well you and the baby are breastfeeding and may alert you to any real or potential breastfeeding problems. All answers should land in column A.         IF ANY ANSWERS ARE IN COLULMN B, PLEASE CALL THE     LACTATION WARMLINE FOR ASSISTANCE [phone]     Question   A   B*     Has your milk come in?   yes   no     Are you able to: easily latch the baby to both breasts &/or easily and comfortably pump both breasts?   yes   no     Do you have very sore, tender nipples?   no   yes     Are your nipples scabbed, cracked, blistered, or bleeding?   no   yes     Do you usually have to wake the baby to feed?   no   yes     Does the baby feed at least 8 or more times in a 24-hour period?   yes   no      Do you hear/see the baby swallowing after every 1-3 sucks throughout the breastfeeding session &/or does the baby bottle-feed comfortably and without difficulty?   yes   no     Do your breasts feel softer: after the baby has finished nursing &/or after you have finished pumping?   yes   no     Does the baby act hungry by rooting/sucking on his fingers after feeding or within 30 minutes of feeding?   no   yes     Have the baby's bowel movements changed from dark & sticky to bright yellow, soft, watery, & seedy?   yes   no     Does the baby have at least 3 or 4 medium sized stools in a 24-hour period?   yes   no     Does the baby have dark yellow or pink colored urine diapers?   no   yes     Are you more comfortable and confident about breastfeeding?   yes   no       Screening Form Early Follow-up of Breast-fed Infants Adapted with permission from [facility], [place], [place].      Avoid pacifiers and bottles for the first 4 weeks to aid in establishing your milk supply. Your baby should be examined by a pediatrician at 3-5 days of age and again at 2 weeks of age. Once your milk production increases, the baby should be gaining at least ½ - 1oz each day and should be back to birthweight no later than 10-14 days of age. If this is not the case, please call the Breastfeeding Warmline [phone] for assistance and support.    Satisfy your hunger and thirst by eating when hungry, snacking often, and staying hydrated. Keep healthy snacks and water in the space where you usually breastfeed or pump. Take naps as needed and rest when your baby is sleeping.        Your Guide to Postpartum & [place] Care has QR codes throughout. A QR code is a black and white square barcode that can contain information such as a web link for detailed information or video and can be scanned easily by a smartphone camera.     Making Milk: page 36    Feeding cues: page 36    Laid-Back Latch Position: page 37    Medication and Drug:  page 41    Hand Expression: page 42         Breastfeeding Videos:     Richard Pauer - 3P - [employer]       Attaching Your Baby at the Breast - Video - Richard Pauer - 3P Project    It teaches and empowers mothers and caregivers.         Community Resources    [facility] Breastfeeding Warmline: [phone]. To schedule an outpatient lactation consultation: [phone]    Infant Risk Center: Is a call center that provides information about the safety of taking medications while breastfeeding. Call [phone], M-F, 8am-5pm, [place].    Local [facility] clinics/[facility] Healthy Babies: It provides incentives and breast pumps to eligible mothers. Connects [place] mothers to health and pregnancy resources, services, and information. 8-964-911-TUDZ ([phone]).       Cafe au Lait: Is a breastfeeding support group for women of color. Meetings are held every 4th Sundays of each month at 2:30 PM. Text BRCAFE to 04853. www.[employer].Flyr/groups/dex     [employer] [facility] [place]: (207) 306-MILK (1611) [email or web address]    For obtaining donor milk or donating milk.        Mother verbalizes understanding of all education and counseling; she denies any further lactation needs or concerns. Encouraged mother to contact lactation with any questions, concerns, or problems contact number provided.

## 2025-07-28 ENCOUNTER — PATIENT MESSAGE (OUTPATIENT)
Dept: OBSTETRICS AND GYNECOLOGY | Facility: CLINIC | Age: 25
End: 2025-07-28
Payer: MEDICAID

## 2025-08-07 ENCOUNTER — LAB VISIT (OUTPATIENT)
Dept: LAB | Facility: HOSPITAL | Age: 25
End: 2025-08-07
Attending: MIDWIFE
Payer: MEDICAID

## 2025-08-07 ENCOUNTER — POSTPARTUM VISIT (OUTPATIENT)
Dept: OBSTETRICS AND GYNECOLOGY | Facility: CLINIC | Age: 25
End: 2025-08-07
Payer: MEDICAID

## 2025-08-07 VITALS
DIASTOLIC BLOOD PRESSURE: 78 MMHG | BODY MASS INDEX: 29.66 KG/M2 | WEIGHT: 156.94 LBS | SYSTOLIC BLOOD PRESSURE: 100 MMHG

## 2025-08-07 DIAGNOSIS — O99.013 ANEMIA DURING PREGNANCY IN THIRD TRIMESTER: ICD-10-CM

## 2025-08-07 DIAGNOSIS — K64.4 EXTERNAL HEMORRHOIDS WITHOUT COMPLICATION: ICD-10-CM

## 2025-08-07 PROBLEM — M54.42 ACUTE BILATERAL LOW BACK PAIN WITH BILATERAL SCIATICA: Status: RESOLVED | Noted: 2025-02-13 | Resolved: 2025-08-07

## 2025-08-07 PROBLEM — R29.898 HIP WEAKNESS: Status: RESOLVED | Noted: 2025-02-13 | Resolved: 2025-08-07

## 2025-08-07 PROBLEM — M54.41 ACUTE BILATERAL LOW BACK PAIN WITH BILATERAL SCIATICA: Status: RESOLVED | Noted: 2025-02-13 | Resolved: 2025-08-07

## 2025-08-07 PROBLEM — N76.0 RECURRENT VAGINITIS: Status: RESOLVED | Noted: 2024-11-07 | Resolved: 2025-08-07

## 2025-08-07 PROBLEM — R51.9 FREQUENT HEADACHES: Status: RESOLVED | Noted: 2024-04-29 | Resolved: 2025-08-07

## 2025-08-07 LAB
ABSOLUTE EOSINOPHIL (OHS): 0.08 K/UL
ABSOLUTE MONOCYTE (OHS): 0.39 K/UL (ref 0.3–1)
ABSOLUTE NEUTROPHIL COUNT (OHS): 3.43 K/UL (ref 1.8–7.7)
BASOPHILS # BLD AUTO: 0.02 K/UL
BASOPHILS NFR BLD AUTO: 0.3 %
ERYTHROCYTE [DISTWIDTH] IN BLOOD BY AUTOMATED COUNT: 17.8 % (ref 11.5–14.5)
HCT VFR BLD AUTO: 34.6 % (ref 37–48.5)
HGB BLD-MCNC: 10.2 GM/DL (ref 12–16)
IMM GRANULOCYTES # BLD AUTO: 0.01 K/UL (ref 0–0.04)
IMM GRANULOCYTES NFR BLD AUTO: 0.2 % (ref 0–0.5)
LYMPHOCYTES # BLD AUTO: 2.14 K/UL (ref 1–4.8)
MCH RBC QN AUTO: 24 PG (ref 27–31)
MCHC RBC AUTO-ENTMCNC: 29.5 G/DL (ref 32–36)
MCV RBC AUTO: 81 FL (ref 82–98)
NUCLEATED RBC (/100WBC) (OHS): 0 /100 WBC
PLATELET # BLD AUTO: 384 K/UL (ref 150–450)
PMV BLD AUTO: 11.4 FL (ref 9.2–12.9)
RBC # BLD AUTO: 4.25 M/UL (ref 4–5.4)
RELATIVE EOSINOPHIL (OHS): 1.3 %
RELATIVE LYMPHOCYTE (OHS): 35.3 % (ref 18–48)
RELATIVE MONOCYTE (OHS): 6.4 % (ref 4–15)
RELATIVE NEUTROPHIL (OHS): 56.5 % (ref 38–73)
WBC # BLD AUTO: 6.07 K/UL (ref 3.9–12.7)

## 2025-08-07 PROCEDURE — 85025 COMPLETE CBC W/AUTO DIFF WBC: CPT

## 2025-08-07 PROCEDURE — 99213 OFFICE O/P EST LOW 20 MIN: CPT | Mod: PBBFAC,TH | Performed by: MIDWIFE

## 2025-08-07 PROCEDURE — 36415 COLL VENOUS BLD VENIPUNCTURE: CPT

## 2025-08-07 PROCEDURE — 99999 PR PBB SHADOW E&M-EST. PATIENT-LVL III: CPT | Mod: PBBFAC,,, | Performed by: MIDWIFE

## 2025-08-07 NOTE — PROGRESS NOTES
"  Subjective:       Janelle Clemente is a 24 y.o. female who presents for a postpartum visit. She is 5 weeks postpartum following a spontaneous vaginal delivery. I have fully reviewed the prenatal and intrapartum course. The delivery was at 38.6 gestational weeks. Outcome: spontaneous vaginal delivery. Anesthesia: epidural. Postpartum course has been normal. Baby's course has been normal "Shailesh''. Baby is feeding by breast. Bleeding staining only. Bowel function: reports hemorrhoids prior to pregnancy, but have since gotten worse. Bladder function is normal. Patient is not sexually active. Contraception method is abstinence. Postpartum depression screening: negative.    The following portions of the patient's history were reviewed and updated as appropriate: allergies, current medications, past family history, past medical history, past social history, past surgical history, and problem list.    Review of Systems  Pertinent items are noted in HPI.     Objective:      LMP 09/14/2024 (Exact Date)    General:  alert, appears stated age, and cooperative    Vulva:  normal   Vagina: not evaluated   Rectal Exam: external hemorrhoids, not thrombosed          Assessment:      Routine postpartum exam. Pap smear not done at today's visit.     Plan:      1. Contraception: condoms  2. Pap due 12/2026. Continue yearly [facility] exams.   3. Follow up in: 1 year or as needed.     Postpartum exam    Anemia during pregnancy in third trimester  -     CBC Auto Differential; Future; Expected date: 08/07/2025    External hemorrhoids without complication  -     Ambulatory referral/consult to General Surgery; Future; Expected date: 08/14/2025       "

## 2025-08-20 ENCOUNTER — TELEPHONE (OUTPATIENT)
Dept: PRIMARY CARE CLINIC | Facility: CLINIC | Age: 25
End: 2025-08-20
Payer: MEDICAID

## 2025-08-21 ENCOUNTER — TELEPHONE (OUTPATIENT)
Dept: PRIMARY CARE CLINIC | Facility: CLINIC | Age: 25
End: 2025-08-21
Payer: MEDICAID